# Patient Record
Sex: MALE | Race: WHITE | NOT HISPANIC OR LATINO | Employment: OTHER | URBAN - NONMETROPOLITAN AREA
[De-identification: names, ages, dates, MRNs, and addresses within clinical notes are randomized per-mention and may not be internally consistent; named-entity substitution may affect disease eponyms.]

---

## 2018-08-31 ENCOUNTER — TRANSCRIBE ORDERS (OUTPATIENT)
Dept: ADMINISTRATIVE | Facility: HOSPITAL | Age: 53
End: 2018-08-31

## 2018-08-31 DIAGNOSIS — R10.9 FLANK PAIN: Primary | ICD-10-CM

## 2018-09-06 ENCOUNTER — HOSPITAL ENCOUNTER (OUTPATIENT)
Dept: CT IMAGING | Facility: HOSPITAL | Age: 53
Discharge: HOME | End: 2018-09-06
Admitting: PHYSICIAN ASSISTANT

## 2018-09-06 DIAGNOSIS — R10.9 FLANK PAIN: ICD-10-CM

## 2018-09-06 PROCEDURE — 74176 CT ABD & PELVIS W/O CONTRAST: CPT

## 2018-11-01 ENCOUNTER — HOSPITAL ENCOUNTER (EMERGENCY)
Facility: HOSPITAL | Age: 53
Discharge: HOME | End: 2018-11-01
Attending: EMERGENCY MEDICINE | Admitting: EMERGENCY MEDICINE

## 2018-11-01 VITALS
SYSTOLIC BLOOD PRESSURE: 178 MMHG | BODY MASS INDEX: 34.16 KG/M2 | DIASTOLIC BLOOD PRESSURE: 94 MMHG | OXYGEN SATURATION: 95 % | HEIGHT: 65 IN | RESPIRATION RATE: 20 BRPM | HEART RATE: 89 BPM | WEIGHT: 205 LBS | TEMPERATURE: 97.8 F

## 2018-11-01 DIAGNOSIS — M54.5 LEFT LOW BACK PAIN, UNSPECIFIED CHRONICITY, WITH SCIATICA PRESENCE UNSPECIFIED: Primary | ICD-10-CM

## 2018-11-01 PROCEDURE — 99283 EMERGENCY DEPT VISIT LOW MDM: CPT

## 2018-11-01 RX ORDER — METHOCARBAMOL 500 MG/1
1500 TABLET, FILM COATED ORAL ONCE
Status: COMPLETED | OUTPATIENT
Start: 2018-11-01 | End: 2018-11-01

## 2018-11-01 RX ORDER — MELOXICAM 7.5 MG/1
7.5 TABLET ORAL DAILY
Qty: 14 TABLET | Refills: 0 | Status: SHIPPED | OUTPATIENT
Start: 2018-11-01

## 2018-11-01 RX ORDER — METHOCARBAMOL 750 MG/1
750 TABLET, FILM COATED ORAL 3 TIMES DAILY PRN
Qty: 21 TABLET | Refills: 0 | Status: SHIPPED | OUTPATIENT
Start: 2018-11-01

## 2018-11-01 RX ORDER — MELOXICAM 7.5 MG/1
7.5 TABLET ORAL DAILY
Status: DISCONTINUED | OUTPATIENT
Start: 2018-11-01 | End: 2018-11-02 | Stop reason: HOSPADM

## 2018-11-01 RX ADMIN — METHOCARBAMOL 1500 MG: 500 TABLET ORAL at 21:37

## 2018-11-01 RX ADMIN — MELOXICAM 7.5 MG: 7.5 TABLET ORAL at 21:36

## 2018-11-02 NOTE — ED PROVIDER NOTES
Subjective   History of Present Illness  This is a 53-year-old gentleman who comes in today complaining of lower back pain.  He reports he was bending over when he felt a pulling sensation in the left side of his lower back.  He states that he has back pain occasionally when he gets constipated and feels fullness putting pressure on his back.  He said she did treat his constipation earlier today and has had some relief but the pulling on his lower back has continued.  He did lay on a heating pad with minimal relief.  He denies any loss of bowel or bladder.  Review of Systems   Constitutional: Negative.    HENT: Negative.    Eyes: Negative.    Respiratory: Negative.    Cardiovascular: Negative.    Gastrointestinal: Negative.    Endocrine: Negative.    Genitourinary: Negative.    Musculoskeletal: Positive for back pain.   Skin: Negative.    Allergic/Immunologic: Negative.    Neurological: Negative.    Hematological: Negative.    Psychiatric/Behavioral: Negative.    All other systems reviewed and are negative.      Past Medical History:   Diagnosis Date   • Diabetes mellitus (CMS/[facility])    • Hypertension        No Known Allergies    History reviewed. No pertinent surgical history.    History reviewed. No pertinent family history.    Social History     Social History   • Marital status:      Social History Main Topics   • Smoking status: Never Smoker   • Alcohol use No   • Drug use: No     Other Topics Concern   • Not on file           Objective   Physical Exam   Constitutional: He appears well-developed and well-nourished.   Nursing note and vitals reviewed.  GEN: No acute distress  Head: Normocephalic, atraumatic  Eyes: Pupils equal round reactive to light  ENT: Posterior pharynx normal in appearance, oral mucosa is moist  Chest: Nontender to palpation  Cardiovascular: Regular rate  Lungs: Clear to auscultation bilaterally  Abdomen: Soft, nontender, nondistended, no peritoneal signs  Extremities: No edema,  normal appearance  Tender left paravertebral spine.  Neuro: GCS 15  Psych: Mood and affect are appropriate      Procedures           ED Course                  MDM  Number of Diagnoses or Management Options  Diagnosis management comments: I did offer to do further testing for his constipation but he states that this is a normal occurrence for him.  He denies any pain and he does not want to get an enema.  He states that the laxatives that he took earlier will help shortly.  He is only requesting something for his lower back pain today.  So we will give him anti-inflammatories and muscle relaxants and have him follow-up with his primary care provider also given him some back stretching exercises he is agreeable to this plan of care.       Amount and/or Complexity of Data Reviewed  Review and summarize past medical records: yes  Discuss the patient with other providers: yes    Risk of Complications, Morbidity, and/or Mortality  Presenting problems: minimal  Diagnostic procedures: minimal  Management options: minimal          Final diagnoses:   Left low back pain, unspecified chronicity, with sciatica presence unspecified            Aparna Irvin, APRN  11/01/18 2007

## 2020-12-23 NOTE — PROGRESS NOTES
Patient: Neftali Irvin    YOB: 1965    Date: 12/29/2020    Primary Care Provider: Jakub Arnold PA    Chief Complaint   Patient presents with   • Black or Bloody Stool       SUBJECTIVE:    History of present illness:  I saw the patient in the office today as a consultation for evaluation and treatment of recent Cologuard which was done 12/07/2020.Patient states strong family history of cancer. Patient states constipation, abdominal pain and occasion diarrhea however denies nausea or vomiting    No other problems at this time.    Patient's Body mass index is 36.94 kg/m². BMI is above normal parameters. Recommendations include: educational material and exercise counseling.    The following portions of the patient's history were reviewed and updated as appropriate: allergies, current medications, past family history, past medical history, past social history, past surgical history and problem list.    Review of Systems   Constitutional: Negative for chills, fever and unexpected weight change.   HENT: Negative for trouble swallowing and voice change.    Eyes: Negative for visual disturbance.   Respiratory: Negative for apnea, cough, chest tightness, shortness of breath and wheezing.    Cardiovascular: Negative for chest pain, palpitations and leg swelling.   Gastrointestinal: Positive for abdominal pain, blood in stool, constipation and diarrhea. Negative for abdominal distention, anal bleeding, nausea, rectal pain and vomiting.   Endocrine: Negative for cold intolerance and heat intolerance.   Genitourinary: Negative for difficulty urinating, dysuria, flank pain, scrotal swelling and testicular pain.   Musculoskeletal: Negative for back pain, gait problem and joint swelling.   Skin: Negative for color change, rash and wound.   Neurological: Negative for dizziness, syncope, speech difficulty, weakness, numbness and headaches.   Hematological: Negative for adenopathy. Does not bruise/bleed  "easily.   Psychiatric/Behavioral: Negative for confusion. The patient is not nervous/anxious.        History:  Past Medical History:   Diagnosis Date   • Diabetes mellitus (CMS/HCC)    • Hypertension        History reviewed. No pertinent surgical history.    Family History   Problem Relation Age of Onset   • Cancer Mother    • Heart disease Father        Social History     Tobacco Use   • Smoking status: Never Smoker   Substance Use Topics   • Alcohol use: No   • Drug use: No       Allergies:  No Known Allergies    Medications:    Current Outpatient Medications:   •  B-D UF III MINI PEN NEEDLES 31G X 5 MM misc, , Disp: , Rfl:   •  glipizide (GLUCOTROL XL) 2.5 MG 24 hr tablet, , Disp: , Rfl:   •  Lancets (OneTouch Delica Plus [id number]) misc, , Disp: , Rfl:   •  meloxicam (MOBIC) 7.5 MG tablet, Take 1 tablet by mouth Daily., Disp: 14 tablet, Rfl: 0  •  methocarbamol (ROBAXIN) 750 MG tablet, Take 1 tablet by mouth 3 (Three) Times a Day As Needed for Muscle Spasms., Disp: 21 tablet, Rfl: 0  •  OneTouch Verio test strip, , Disp: , Rfl:     OBJECTIVE:    Vital Signs:   Vitals:    12/29/20 1327   BP: 150/82   Pulse: 91   Temp: 97.4 °F (36.3 °C)   SpO2: 99%   Weight: 101 kg (222 lb)   Height: 165.1 cm (65\")       Physical Exam:   General Appearance:    Alert, cooperative, in no acute distress   Head:    Normocephalic, without obvious abnormality, atraumatic   Eyes:            Lids and lashes normal, conjunctivae and sclerae normal, no   icterus, no pallor, corneas clear, PERRL   Ears:    Ears appear intact with no abnormalities noted   Throat:   No oral lesions, no thrush, oral mucosa moist   Neck:   No adenopathy, supple, trachea midline, no thyromegaly,  no JVD   Lungs:     Clear to auscultation,respirations regular, even and                  unlabored    Heart:    Regular rhythm and normal rate, normal S1 and S2, no            murmur   Abdomen:     no masses, no organomegaly, soft non-tender, non-distended, no guarding, " there is no evidence of tenderness, no peritoneal signs   Extremities:   Moves all extremities well, no edema, no cyanosis, no             redness   Pulses:   Pulses palpable and equal bilaterally   Skin:   No bleeding, bruising or rash   Lymph nodes:   No palpable adenopathy   Neurologic:   Cranial nerves 2 - 12 grossly intact, sensation intact      Results Review:   I reviewed the patient's new clinical results.  I reviewed the patient's new imaging results and agree with the interpretation.  I reviewed the patient's other test results and agree with the interpretation    Review of Systems was reviewed and confirmed as accurate as documented by the MA.    ASSESSMENT/PLAN:    1. Heme positive stool        I recommend a colonoscopy for further evaluation. The procedure was explained as well as the risks which include but are not limited to bleeding, infection, perforation, abdominal pain etc. The patient understands these risks and the procedure and wishes to proceed.     Electronically signed by Michael Marroquin MD  12/30/20 11:05 EST

## 2020-12-29 ENCOUNTER — OFFICE VISIT (OUTPATIENT)
Dept: SURGERY | Facility: CLINIC | Age: 55
End: 2020-12-29

## 2020-12-29 VITALS
OXYGEN SATURATION: 99 % | TEMPERATURE: 97.4 F | HEART RATE: 91 BPM | HEIGHT: 65 IN | DIASTOLIC BLOOD PRESSURE: 82 MMHG | SYSTOLIC BLOOD PRESSURE: 150 MMHG | BODY MASS INDEX: 36.99 KG/M2 | WEIGHT: 222 LBS

## 2020-12-29 DIAGNOSIS — R19.5 HEME POSITIVE STOOL: Primary | ICD-10-CM

## 2020-12-29 DIAGNOSIS — Z01.818 PRE-OP TESTING: Primary | ICD-10-CM

## 2020-12-29 PROCEDURE — 99244 OFF/OP CNSLTJ NEW/EST MOD 40: CPT | Performed by: SURGERY

## 2020-12-29 RX ORDER — GLIPIZIDE 2.5 MG/1
2.5 TABLET, EXTENDED RELEASE ORAL DAILY
COMMUNITY
Start: 2020-12-04

## 2020-12-29 RX ORDER — FLURBIPROFEN SODIUM 0.3 MG/ML
SOLUTION/ DROPS OPHTHALMIC
COMMUNITY
Start: 2020-12-04

## 2020-12-29 RX ORDER — LANCETS 33 GAUGE
EACH MISCELLANEOUS
COMMUNITY
Start: 2020-12-04

## 2020-12-29 RX ORDER — BLOOD SUGAR DIAGNOSTIC
STRIP MISCELLANEOUS
COMMUNITY
Start: 2020-12-04

## 2020-12-31 ENCOUNTER — LAB (OUTPATIENT)
Dept: LAB | Facility: HOSPITAL | Age: 55
End: 2020-12-31

## 2020-12-31 ENCOUNTER — APPOINTMENT (OUTPATIENT)
Dept: LAB | Facility: HOSPITAL | Age: 55
End: 2020-12-31

## 2020-12-31 DIAGNOSIS — Z01.818 PRE-OP TESTING: ICD-10-CM

## 2020-12-31 PROBLEM — R19.5 HEME POSITIVE STOOL: Status: ACTIVE | Noted: 2020-12-31

## 2020-12-31 LAB — SARS-COV-2 RNA RESP QL NAA+PROBE: NOT DETECTED

## 2020-12-31 PROCEDURE — U0004 COV-19 TEST NON-CDC HGH THRU: HCPCS

## 2020-12-31 PROCEDURE — C9803 HOPD COVID-19 SPEC COLLECT: HCPCS

## 2020-12-31 RX ORDER — INSULIN GLARGINE 100 [IU]/ML
50 INJECTION, SOLUTION SUBCUTANEOUS DAILY
COMMUNITY

## 2020-12-31 RX ORDER — BISACODYL 5 MG
TABLET, DELAYED RELEASE (ENTERIC COATED) ORAL
Qty: 4 TABLET | Refills: 0 | Status: SHIPPED | OUTPATIENT
Start: 2020-12-31

## 2020-12-31 RX ORDER — LOSARTAN POTASSIUM 100 MG/1
100 TABLET ORAL DAILY
COMMUNITY

## 2020-12-31 RX ORDER — POLYETHYLENE GLYCOL 3350 17 G/17G
POWDER, FOR SOLUTION ORAL
Qty: 238 G | Refills: 0 | Status: SHIPPED | OUTPATIENT
Start: 2020-12-31

## 2020-12-31 RX ORDER — CHLORAL HYDRATE 500 MG
1000 CAPSULE ORAL
COMMUNITY

## 2020-12-31 NOTE — TELEPHONE ENCOUNTER
Please approve rx for patient's prep for colonoscopy Monday. Pharmacy will not allow me to call it in over the phone.

## 2021-02-25 ENCOUNTER — TELEPHONE (OUTPATIENT)
Dept: SURGERY | Facility: CLINIC | Age: 56
End: 2021-02-25

## 2021-02-25 DIAGNOSIS — Z01.818 PRE-OP TESTING: Primary | ICD-10-CM

## 2021-02-25 NOTE — TELEPHONE ENCOUNTER
Pt rescheduled colonoscopy for 03/11/2021.  Rx for prep was sent to [facility], I called to confirm that they still had on file, I left message asking them to return my call.

## 2021-03-09 ENCOUNTER — LAB (OUTPATIENT)
Dept: LAB | Facility: HOSPITAL | Age: 56
End: 2021-03-09

## 2021-03-09 DIAGNOSIS — Z01.818 PRE-OP TESTING: ICD-10-CM

## 2021-03-09 LAB — SARS-COV-2 RNA RESP QL NAA+PROBE: NOT DETECTED

## 2021-03-09 PROCEDURE — U0004 COV-19 TEST NON-CDC HGH THRU: HCPCS

## 2021-03-09 PROCEDURE — C9803 HOPD COVID-19 SPEC COLLECT: HCPCS

## 2021-03-11 ENCOUNTER — ANESTHESIA (OUTPATIENT)
Dept: GASTROENTEROLOGY | Facility: HOSPITAL | Age: 56
End: 2021-03-11

## 2021-03-11 ENCOUNTER — HOSPITAL ENCOUNTER (OUTPATIENT)
Facility: HOSPITAL | Age: 56
Setting detail: HOSPITAL OUTPATIENT SURGERY
Discharge: HOME | End: 2021-03-11
Attending: SURGERY | Admitting: SURGERY

## 2021-03-11 ENCOUNTER — ANESTHESIA EVENT (OUTPATIENT)
Dept: GASTROENTEROLOGY | Facility: HOSPITAL | Age: 56
End: 2021-03-11

## 2021-03-11 VITALS
BODY MASS INDEX: 33.32 KG/M2 | RESPIRATION RATE: 18 BRPM | HEIGHT: 65 IN | WEIGHT: 200 LBS | OXYGEN SATURATION: 95 % | SYSTOLIC BLOOD PRESSURE: 156 MMHG | HEART RATE: 71 BPM | TEMPERATURE: 97 F | DIASTOLIC BLOOD PRESSURE: 85 MMHG

## 2021-03-11 DIAGNOSIS — R19.5 HEME POSITIVE STOOL: ICD-10-CM

## 2021-03-11 LAB — GLUCOSE BLDC GLUCOMTR-MCNC: 233 MG/DL (ref 70–130)

## 2021-03-11 PROCEDURE — 45381 COLONOSCOPY SUBMUCOUS NJX: CPT | Performed by: SURGERY

## 2021-03-11 PROCEDURE — 45385 COLONOSCOPY W/LESION REMOVAL: CPT | Performed by: SURGERY

## 2021-03-11 PROCEDURE — 82962 GLUCOSE BLOOD TEST: CPT

## 2021-03-11 PROCEDURE — S0260 H&P FOR SURGERY: HCPCS | Performed by: SURGERY

## 2021-03-11 PROCEDURE — 45384 COLONOSCOPY W/LESION REMOVAL: CPT | Performed by: SURGERY

## 2021-03-11 PROCEDURE — 25010000002 PROPOFOL 10 MG/ML EMULSION: Performed by: NURSE ANESTHETIST, CERTIFIED REGISTERED

## 2021-03-11 DEVICE — CLIPPING DEVICE
Type: IMPLANTABLE DEVICE | Site: ABDOMEN | Status: FUNCTIONAL
Brand: RESOLUTION CLIP

## 2021-03-11 RX ORDER — MAGNESIUM HYDROXIDE 1200 MG/15ML
LIQUID ORAL AS NEEDED
Status: DISCONTINUED | OUTPATIENT
Start: 2021-03-11 | End: 2021-03-11 | Stop reason: HOSPADM

## 2021-03-11 RX ORDER — LIDOCAINE HYDROCHLORIDE 20 MG/ML
INJECTION, SOLUTION INTRAVENOUS AS NEEDED
Status: DISCONTINUED | OUTPATIENT
Start: 2021-03-11 | End: 2021-03-11 | Stop reason: SURG

## 2021-03-11 RX ORDER — ONDANSETRON 2 MG/ML
4 INJECTION INTRAMUSCULAR; INTRAVENOUS ONCE AS NEEDED
Status: CANCELLED | OUTPATIENT
Start: 2021-03-11 | End: 2021-03-11

## 2021-03-11 RX ORDER — SODIUM CHLORIDE, SODIUM LACTATE, POTASSIUM CHLORIDE, CALCIUM CHLORIDE 600; 310; 30; 20 MG/100ML; MG/100ML; MG/100ML; MG/100ML
1000 INJECTION, SOLUTION INTRAVENOUS CONTINUOUS
Status: DISCONTINUED | OUTPATIENT
Start: 2021-03-11 | End: 2021-03-11 | Stop reason: HOSPADM

## 2021-03-11 RX ORDER — PROPOFOL 10 MG/ML
VIAL (ML) INTRAVENOUS AS NEEDED
Status: DISCONTINUED | OUTPATIENT
Start: 2021-03-11 | End: 2021-03-11 | Stop reason: SURG

## 2021-03-11 RX ADMIN — LIDOCAINE HYDROCHLORIDE 100 MG: 20 INJECTION, SOLUTION INTRAVENOUS at 09:24

## 2021-03-11 RX ADMIN — PROPOFOL 400 MG: 10 INJECTION, EMULSION INTRAVENOUS at 09:50

## 2021-03-11 RX ADMIN — SODIUM CHLORIDE, POTASSIUM CHLORIDE, SODIUM LACTATE AND CALCIUM CHLORIDE 1000 ML: 600; 310; 30; 20 INJECTION, SOLUTION INTRAVENOUS at 07:31

## 2021-03-11 NOTE — ANESTHESIA POSTPROCEDURE EVALUATION
Patient: Neftali Irvin    Procedure Summary     Date: 03/11/21 Room / Location: [facility] ENDOSCOPY 2 / [facility] ENDOSCOPY    Anesthesia Start: 0918 Anesthesia Stop: 0956    Procedure: COLONOSCOPY WITH HOT SNARE POLYPECTOMY AND TATTOO INJECTION, CLIP PLACEMENT X1 (N/A ) Diagnosis:       Heme positive stool      (Heme positive stool [R19.5])    Surgeons: Michael Marroquin MD Provider: Abhishek Willis CRNA    Anesthesia Type: MAC ASA Status: 3          Anesthesia Type: MAC    Vitals  Vitals Value Taken Time   /84 03/11/21 0958   Temp 97 °F (36.1 °C) 03/11/21 0958   Pulse 73 03/11/21 0958   Resp 20 03/11/21 0958   SpO2 97 % 03/11/21 0958           Post Anesthesia Care and Evaluation    Patient location during evaluation: PHASE II  Patient participation: complete - patient participated  Level of consciousness: awake  Pain score: 1  Pain management: adequate  Airway patency: patent  Anesthetic complications: No anesthetic complications  PONV Status: controlled  Cardiovascular status: acceptable and stable  Respiratory status: acceptable  Hydration status: acceptable

## 2021-03-11 NOTE — ANESTHESIA PREPROCEDURE EVALUATION
Anesthesia Evaluation     Patient summary reviewed and Nursing notes reviewed   no history of anesthetic complications:  NPO Solid Status: > 8 hours  NPO Liquid Status: > 8 hours           Airway   Mallampati: II  TM distance: >3 FB  Neck ROM: full  no difficulty expected  Dental - normal exam     Pulmonary - normal exam   (+) asthma,sleep apnea,   Cardiovascular - normal exam    Rhythm: regular  Rate: normal    (+) hypertension, hyperlipidemia,       Neuro/Psych  (+) psychiatric history Anxiety and Depression,     GI/Hepatic/Renal/Endo    (+) obesity,  GERD,  diabetes mellitus using insulin,     Musculoskeletal     Abdominal    Substance History - negative use     OB/GYN negative ob/gyn ROS         Other   arthritis,                      Anesthesia Plan    ASA 3     MAC   (Pt told that intravenous sedation will be used as the primary anesthetic along with local anesthesia if necessary. Every effort will be made to make sure the patient is comfortable.     The patient was told they may or may not have recall for the procedure. It was further explained that if the MAC was not adequate that a general anesthetic with either an LMA or endotracheal tube would be required.     Will proceed with the plan of care.)  intravenous induction     Anesthetic plan, all risks, benefits, and alternatives have been provided, discussed and informed consent has been obtained with: patient.

## 2021-03-16 LAB
LAB AP CASE REPORT: NORMAL
PATH REPORT.FINAL DX SPEC: NORMAL

## 2021-03-18 ENCOUNTER — OFFICE VISIT (OUTPATIENT)
Dept: SURGERY | Facility: CLINIC | Age: 56
End: 2021-03-18

## 2021-03-18 VITALS
WEIGHT: 225 LBS | BODY MASS INDEX: 37.49 KG/M2 | HEIGHT: 65 IN | TEMPERATURE: 98.7 F | OXYGEN SATURATION: 98 % | DIASTOLIC BLOOD PRESSURE: 78 MMHG | SYSTOLIC BLOOD PRESSURE: 126 MMHG | HEART RATE: 80 BPM

## 2021-03-18 DIAGNOSIS — D12.3 ADENOMATOUS POLYP OF TRANSVERSE COLON: Primary | ICD-10-CM

## 2021-03-18 PROCEDURE — 99213 OFFICE O/P EST LOW 20 MIN: CPT | Performed by: SURGERY

## 2021-03-18 NOTE — PROGRESS NOTES
Patient: Neftali Irvin    YOB: 1965    Date: 03/18/2021    Primary Care Provider: Jakub Arnold PA    Reason for Consultation: Follow-up colonoscopy    Chief Complaint   Patient presents with   • Follow-up     colon       History of present illness:  I saw the patient in the office today as a followup from their recent colonoscopy with polypectomy and proximal transverse colon tattooing, the pathology report did show sessile serrated adenoma and hyperplastic polyps.  They state that they have done well and are having no complaints.    He did have a proximal transverse colon polyp removed that was a sessile adenoma, tattooing was performed at the time of endoscopy.    The following portions of the patient's history were reviewed and updated as appropriate: allergies, current medications, past family history, past medical history, past social history, past surgical history and problem list.    Review of Systems   Constitutional: Negative for chills, fever and unexpected weight change.   HENT: Negative for trouble swallowing and voice change.    Eyes: Negative for visual disturbance.   Respiratory: Negative for apnea, cough, chest tightness, shortness of breath and wheezing.    Cardiovascular: Negative for chest pain, palpitations and leg swelling.   Gastrointestinal: Negative for abdominal distention, abdominal pain, anal bleeding, blood in stool, constipation, diarrhea, nausea, rectal pain and vomiting.   Endocrine: Negative for cold intolerance and heat intolerance.   Genitourinary: Negative for difficulty urinating, dysuria, flank pain, scrotal swelling and testicular pain.   Musculoskeletal: Negative for back pain, gait problem and joint swelling.   Skin: Negative for color change, rash and wound.   Neurological: Negative for dizziness, syncope, speech difficulty, weakness, numbness and headaches.   Hematological: Negative for adenopathy. Does not bruise/bleed easily.  "  Psychiatric/Behavioral: Negative for confusion. The patient is not nervous/anxious.        Allergies:  Allergies   Allergen Reactions   • Grapeseed Extract [Nutritional Supplements] Hives     Allergy to grapes   • Milk-Related Compounds Other (See Comments)     Runny nose, post nasal drip   • Other Other (See Comments)     Powder detergents cause blisters     • Red Dye Hives   • Strawberry Hives       Medications:    Current Outpatient Medications:   •  B-D UF III MINI PEN NEEDLES 31G X 5 MM misc, , Disp: , Rfl:   •  bisacodyl (Dulcolax) 5 MG EC tablet, Take 4 tablets at 1:00PM with 8oz of clear liquids the day before your colonoscopy., Disp: 4 tablet, Rfl: 0  •  glipizide (GLUCOTROL XL) 2.5 MG 24 hr tablet, Take 2.5 mg by mouth Daily., Disp: , Rfl:   •  Insulin Glargine (BASAGLAR KWIKPEN) 100 UNIT/ML injection pen, Inject 50 Units under the skin into the appropriate area as directed Daily., Disp: , Rfl:   •  Lancets (OneTouch Delica Plus [id number]) misc, , Disp: , Rfl:   •  losartan (COZAAR) 100 MG tablet, Take 100 mg by mouth Daily., Disp: , Rfl:   •  meloxicam (MOBIC) 7.5 MG tablet, Take 1 tablet by mouth Daily. (Patient not taking: Reported on 12/31/2020), Disp: 14 tablet, Rfl: 0  •  methocarbamol (ROBAXIN) 750 MG tablet, Take 1 tablet by mouth 3 (Three) Times a Day As Needed for Muscle Spasms. (Patient not taking: Reported on 12/31/2020), Disp: 21 tablet, Rfl: 0  •  Omega-3 Fatty Acids (fish oil) 1000 MG capsule capsule, Take 1,000 mg by mouth Daily With Breakfast., Disp: , Rfl:   •  OneTouch Verio test strip, , Disp: , Rfl:   •  polyethylene glycol (MIRALAX) 17 GM/SCOOP powder, Mix 238g powder with 64oz of clear liquid at 4:00PM. Drink 8oz every 10-15 minutes until consumed., Disp: 238 g, Rfl: 0    Vital Signs:  Vitals:    03/18/21 1348   BP: 126/78   Pulse: 80   Temp: 98.7 °F (37.1 °C)   SpO2: 98%   Weight: 102 kg (225 lb)   Height: 165.1 cm (65\")       Physical Exam:   General Appearance:    Alert, " cooperative, in no acute distress   Abdomen:     no masses, no organomegaly, soft non-tender, non-distended, no guarding, wounds are well healed, no evidence of recurrent hernia, no peritoneal signs   Chest:      Clear to ausculation            Cor:     Regular rate and rhythm    Results Review:   I reviewed the patient's new clinical results.  I reviewed the patient's new imaging results and agree with the interpretation.  I reviewed the patient's other test results and agree with the interpretation    Assessment / Plan:    1. Adenomatous polyp of transverse colon        I did discuss the situation with the patient today in the office and they have done well from their recent colonoscopy with polypectomy.  I have released the patient back to normal activity.  I need to see the patient back in the office in 6 months for short-term follow-up colonoscopy due to the flat nature of the above-mentioned proximal transverse colon sessile serrated adenoma and they will need to have repeat colonoscopy at that time.    Electronically signed by Michael Marroquin MD  03/18/21

## 2021-06-14 ENCOUNTER — TRANSCRIBE ORDERS (OUTPATIENT)
Dept: ADMINISTRATIVE | Facility: HOSPITAL | Age: 56
End: 2021-06-14

## 2021-06-14 DIAGNOSIS — R10.32 LEFT GROIN PAIN: Primary | ICD-10-CM

## 2021-06-16 ENCOUNTER — HOSPITAL ENCOUNTER (OUTPATIENT)
Dept: ULTRASOUND IMAGING | Facility: HOSPITAL | Age: 56
Discharge: HOME | End: 2021-06-16
Admitting: NURSE PRACTITIONER

## 2021-06-16 DIAGNOSIS — R10.32 LEFT GROIN PAIN: ICD-10-CM

## 2021-06-16 PROCEDURE — 76882 US LMTD JT/FCL EVL NVASC XTR: CPT

## 2021-06-28 NOTE — PROGRESS NOTES
Patient: Neftali Irvin    YOB: 1965    Date: 06/29/2021    Primary Care Provider: Jakub Arnold PA    Chief Complaint   Patient presents with   • Groin Pain     left       SUBJECTIVE:    History of present illness:  I saw the patient in the office today as a consultation for evaluation and treatment of a painful bulge in his left inguinal area.  Patient had left groin ultrasound performed 06/16/2021 which showed a fat-containing left inguinal hernia.    He does complain of some dull discomfort in the left inguinal region, worse after heavy lifting, associated with a palpable mass, present over several months.    The following portions of the patient's history were reviewed and updated as appropriate: allergies, current medications, past family history, past medical history, past social history, past surgical history and problem list.    Review of Systems   Constitutional: Negative for chills, fever and unexpected weight change.   HENT: Negative for trouble swallowing and voice change.    Eyes: Negative for visual disturbance.   Respiratory: Negative for apnea, cough, chest tightness, shortness of breath and wheezing.    Cardiovascular: Negative for chest pain, palpitations and leg swelling.   Gastrointestinal: Negative for abdominal distention, abdominal pain, anal bleeding, blood in stool, constipation, diarrhea, nausea, rectal pain and vomiting.   Endocrine: Negative for cold intolerance and heat intolerance.   Genitourinary: Negative for difficulty urinating, dysuria, flank pain, scrotal swelling and testicular pain.   Musculoskeletal: Negative for back pain, gait problem and joint swelling.   Skin: Negative for color change, rash and wound.   Neurological: Negative for dizziness, syncope, speech difficulty, weakness, numbness and headaches.   Hematological: Negative for adenopathy. Does not bruise/bleed easily.   Psychiatric/Behavioral: Negative for confusion. The patient is not  "nervous/anxious.        History:  Past Medical History:   Diagnosis Date   • Acid reflux    • ADHD    • Arthritis    • Asthma    • Blood vessel problems in eyes     states eye doctor told him he had \"some bleeding\" behind his eyes due to DM   • Cataract, bilateral     no surgery yet   • Diabetes mellitus (CMS/HCC)    • Elevated cholesterol    • Histoplasmosis     eye (doesn't remember laterality)   • Hx of exercise stress test    • Hypertension    • PONV (postoperative nausea and vomiting)    • Sleep apnea     non-compliant with CPAP   • Teeth missing    • Wears glasses        Past Surgical History:   Procedure Laterality Date   • COLONOSCOPY N/A 3/11/2021    Procedure: COLONOSCOPY WITH HOT SNARE POLYPECTOMY AND TATTOO INJECTION, CLIP PLACEMENT X1;  Surgeon: Michael Marrouqin MD;  Location: [facility] ENDOSCOPY;  Service: Gastroenterology;  Laterality: N/A;   • FOOT SURGERY Right     pellet in foot       Family History   Problem Relation Age of Onset   • Cancer Mother    • Heart disease Father        Social History     Tobacco Use   • Smoking status: Never Smoker   • Smokeless tobacco: Never Used   Vaping Use   • Vaping Use: Never used   Substance Use Topics   • Alcohol use: No   • Drug use: No       Allergies:  Allergies   Allergen Reactions   • Grapeseed Extract [Nutritional Supplements] Hives     Allergy to grapes   • Milk-Related Compounds Other (See Comments)     Runny nose, post nasal drip   • Other Other (See Comments)     Powder detergents cause blisters     • Red Dye Hives   • Strawberry Hives       Medications:    Current Outpatient Medications:   •  Aspirin Low Dose 81 MG EC tablet, , Disp: , Rfl:   •  B-D UF III MINI PEN NEEDLES 31G X 5 MM misc, , Disp: , Rfl:   •  bisacodyl (Dulcolax) 5 MG EC tablet, Take 4 tablets at 1:00PM with 8oz of clear liquids the day before your colonoscopy., Disp: 4 tablet, Rfl: 0  •  chlorthalidone (HYGROTON) 25 MG tablet, , Disp: , Rfl:   •  glipizide (GLUCOTROL XL) 2.5 MG 24 hr " "tablet, Take 2.5 mg by mouth Daily., Disp: , Rfl:   •  Insulin Glargine (BASAGLAR KWIKPEN) 100 UNIT/ML injection pen, Inject 50 Units under the skin into the appropriate area as directed Daily., Disp: , Rfl:   •  Januvia 50 MG tablet, , Disp: , Rfl:   •  Lancets (OneTouch Delica Plus [id number]) misc, , Disp: , Rfl:   •  losartan (COZAAR) 100 MG tablet, Take 100 mg by mouth Daily., Disp: , Rfl:   •  meloxicam (MOBIC) 7.5 MG tablet, Take 1 tablet by mouth Daily. (Patient not taking: Reported on 12/31/2020), Disp: 14 tablet, Rfl: 0  •  methocarbamol (ROBAXIN) 750 MG tablet, Take 1 tablet by mouth 3 (Three) Times a Day As Needed for Muscle Spasms. (Patient not taking: Reported on 12/31/2020), Disp: 21 tablet, Rfl: 0  •  Omega-3 Fatty Acids (fish oil) 1000 MG capsule capsule, Take 1,000 mg by mouth Daily With Breakfast., Disp: , Rfl:   •  OneTouch Verio test strip, , Disp: , Rfl:   •  polyethylene glycol (MIRALAX) 17 GM/SCOOP powder, Mix 238g powder with 64oz of clear liquid at 4:00PM. Drink 8oz every 10-15 minutes until consumed., Disp: 238 g, Rfl: 0    OBJECTIVE:    Vital Signs:   Vitals:    06/29/21 1237   BP: 130/78   Pulse: 72   Temp: 97.7 °F (36.5 °C)   SpO2: 97%   Weight: 103 kg (227 lb)   Height: 165.1 cm (65\")       Physical Exam:   General Appearance:    Alert, cooperative, in no acute distress   Head:    Normocephalic, without obvious abnormality, atraumatic   Eyes:            Lids and lashes normal, conjunctivae and sclerae normal, no   icterus, no pallor, corneas clear, PERRLA   Ears:    Ears appear intact with no abnormalities noted   Throat:   No oral lesions, no thrush, oral mucosa moist   Neck:   No adenopathy, supple, trachea midline, no thyromegaly, no   carotid bruit, no JVD   Lungs:     Clear to auscultation,respirations regular, even and                  unlabored    Heart:    Regular rhythm and normal rate, normal S1 and S2, no            murmur   Abdomen:     no masses, no organomegaly, soft " non-tender, non-distended, no guarding, there is evidence of a reducible left inguinal hernia   Extremities:   Moves all extremities well, no edema, no cyanosis, no             redness   Pulses:   Pulses palpable and equal bilaterally   Skin:   No bleeding, bruising or rash   Lymph nodes:   No palpable adenopathy   Neurologic:   Cranial nerves 2 - 12 grossly intact, sensation intact        Results Review:   I reviewed the patient's new clinical results.  I reviewed the patient's new imaging results and agree with the interpretation.  I reviewed the patient's other test results and agree with the interpretation    Review of Systems was reviewed and confirmed as accurate as documented by the MA.    ASSESSMENT/PLAN:    1. Non-recurrent unilateral inguinal hernia with obstruction without gangrene        I had a detailed and extensive discussion with the patient in the office and they understand that they need to undergo hernia repair with mesh.  Full risks and benefits of operative versus nonoperative intervention were discussed with the patient and these included things such as nonresolution of symptoms and possible worsening of symptoms without surgical intervention versus infection, bleeding, possible recurrent hernia, possible postoperative neuralgia from nerve damage or involvement with scar tissue, etc.  The patient understands, agrees, and had no questions for me at the end of the office visit.     I discussed the patients findings and my recommendations with patient.    Electronically signed by Michael Marroquin MD  06/29/21

## 2021-06-29 ENCOUNTER — OFFICE VISIT (OUTPATIENT)
Dept: SURGERY | Facility: CLINIC | Age: 56
End: 2021-06-29

## 2021-06-29 VITALS
TEMPERATURE: 97.7 F | OXYGEN SATURATION: 97 % | WEIGHT: 227 LBS | BODY MASS INDEX: 37.82 KG/M2 | HEIGHT: 65 IN | HEART RATE: 72 BPM | SYSTOLIC BLOOD PRESSURE: 130 MMHG | DIASTOLIC BLOOD PRESSURE: 78 MMHG

## 2021-06-29 DIAGNOSIS — Z01.818 PRE-OP TESTING: Primary | ICD-10-CM

## 2021-06-29 DIAGNOSIS — K40.30 NON-RECURRENT UNILATERAL INGUINAL HERNIA WITH OBSTRUCTION WITHOUT GANGRENE: Primary | ICD-10-CM

## 2021-06-29 PROCEDURE — 99214 OFFICE O/P EST MOD 30 MIN: CPT | Performed by: SURGERY

## 2021-06-29 RX ORDER — SITAGLIPTIN 50 MG/1
TABLET, FILM COATED ORAL
COMMUNITY
Start: 2021-05-14

## 2021-06-29 RX ORDER — CHLORTHALIDONE 25 MG/1
TABLET ORAL
COMMUNITY
Start: 2021-05-14

## 2021-06-29 RX ORDER — ASPIRIN 81 MG/1
TABLET, COATED ORAL
COMMUNITY
Start: 2021-05-14

## 2021-07-07 ENCOUNTER — LAB (OUTPATIENT)
Dept: LAB | Facility: HOSPITAL | Age: 56
End: 2021-07-07

## 2021-07-07 DIAGNOSIS — Z01.818 PRE-OP TESTING: ICD-10-CM

## 2021-07-07 LAB — SARS-COV-2 RNA NOSE QL NAA+PROBE: NOT DETECTED

## 2021-07-07 PROCEDURE — C9803 HOPD COVID-19 SPEC COLLECT: HCPCS

## 2021-07-07 PROCEDURE — U0004 COV-19 TEST NON-CDC HGH THRU: HCPCS

## 2021-07-09 ENCOUNTER — OUTSIDE FACILITY SERVICE (OUTPATIENT)
Dept: SURGERY | Facility: CLINIC | Age: 56
End: 2021-07-09

## 2021-07-09 DIAGNOSIS — K40.30 NON-RECURRENT UNILATERAL INGUINAL HERNIA WITH OBSTRUCTION WITHOUT GANGRENE: Primary | ICD-10-CM

## 2021-07-09 PROCEDURE — 49505 PRP I/HERN INIT REDUC >5 YR: CPT | Performed by: SURGERY

## 2021-07-09 RX ORDER — HYDROCODONE BITARTRATE AND ACETAMINOPHEN 7.5; 325 MG/1; MG/1
1 TABLET ORAL EVERY 6 HOURS PRN
Qty: 15 TABLET | Refills: 0 | Status: SHIPPED | OUTPATIENT
Start: 2021-07-09

## 2021-07-22 NOTE — PROGRESS NOTES
"Patient: Neftali Irvin    YOB: 1965    Date: 07/26/2021    Primary Care Provider: Mary Machado APRN    Chief Complaint   Patient presents with   • Post-op Follow-up     hernia       History of present illness:  I saw the patient in the office today as a followup from their recent left inguinal hernia repair. Pathology report showed findings consistent with hernia formation They state that they have done well and are having no complaints.    Vital Signs:   Vitals:    07/26/21 1116   BP: 138/70   Pulse: 77   Temp: 97.8 °F (36.6 °C)   SpO2: 95%   Weight: 97.1 kg (214 lb)   Height: 165.1 cm (65\")       Physical Exam:   General Appearance:    Alert, cooperative, in no acute distress   Abdomen:     no masses, no organomegaly, soft non-tender, non-distended, no guarding, wounds are well healed, no evidence of recurrent hernia     Assessment / Plan:    1. Postoperative visit        I did discuss the situation with the patient today in the office and they have done well from their recent left inguinal herniorraphy.  I have released the patient back to normal activity, they understand that they need to be careful about heavy lifting.  I need to see the patient back in the office only if they are having further problems, they know to call me if they are.    Electronically signed by Michael Marroquin MD  07/26/21                "

## 2021-07-26 ENCOUNTER — OFFICE VISIT (OUTPATIENT)
Dept: SURGERY | Facility: CLINIC | Age: 56
End: 2021-07-26

## 2021-07-26 VITALS
DIASTOLIC BLOOD PRESSURE: 70 MMHG | HEART RATE: 77 BPM | SYSTOLIC BLOOD PRESSURE: 138 MMHG | TEMPERATURE: 97.8 F | WEIGHT: 214 LBS | BODY MASS INDEX: 35.65 KG/M2 | OXYGEN SATURATION: 95 % | HEIGHT: 65 IN

## 2021-07-26 DIAGNOSIS — Z48.89 POSTOPERATIVE VISIT: Primary | ICD-10-CM

## 2021-07-26 PROCEDURE — 99024 POSTOP FOLLOW-UP VISIT: CPT | Performed by: SURGERY

## 2021-07-26 RX ORDER — AMLODIPINE BESYLATE 10 MG/1
TABLET ORAL
COMMUNITY
Start: 2021-07-16

## 2021-07-26 RX ORDER — SULFAMETHOXAZOLE AND TRIMETHOPRIM 800; 160 MG/1; MG/1
1 TABLET ORAL 2 TIMES DAILY
Qty: 14 TABLET | Refills: 0 | Status: SHIPPED | OUTPATIENT
Start: 2021-07-26 | End: 2021-08-02

## 2021-11-23 ENCOUNTER — TELEPHONE (OUTPATIENT)
Dept: SURGERY | Facility: CLINIC | Age: 56
End: 2021-11-23

## 2021-11-23 NOTE — TELEPHONE ENCOUNTER
Called patient concerning 6 month follow up colon that patient canceled with Dr Marroquin. No answer,left message to call office and reschedule.

## 2022-01-18 ENCOUNTER — TRANSCRIBE ORDERS (OUTPATIENT)
Dept: ADMINISTRATIVE | Facility: HOSPITAL | Age: 57
End: 2022-01-18

## 2022-01-18 DIAGNOSIS — R10.9 RIGHT SIDED ABDOMINAL PAIN: Primary | ICD-10-CM

## 2022-01-23 ENCOUNTER — APPOINTMENT (OUTPATIENT)
Dept: GENERAL RADIOLOGY | Facility: HOSPITAL | Age: 57
End: 2022-01-23

## 2022-01-23 ENCOUNTER — HOSPITAL ENCOUNTER (EMERGENCY)
Facility: HOSPITAL | Age: 57
Discharge: HOME | End: 2022-01-23
Attending: EMERGENCY MEDICINE | Admitting: EMERGENCY MEDICINE

## 2022-01-23 VITALS
HEART RATE: 98 BPM | DIASTOLIC BLOOD PRESSURE: 88 MMHG | RESPIRATION RATE: 20 BRPM | SYSTOLIC BLOOD PRESSURE: 161 MMHG | TEMPERATURE: 98 F | WEIGHT: 209 LBS | HEIGHT: 65 IN | BODY MASS INDEX: 34.82 KG/M2 | OXYGEN SATURATION: 96 %

## 2022-01-23 DIAGNOSIS — J18.9 COMMUNITY ACQUIRED PNEUMONIA, UNSPECIFIED LATERALITY: Primary | ICD-10-CM

## 2022-01-23 PROCEDURE — 71045 X-RAY EXAM CHEST 1 VIEW: CPT

## 2022-01-23 PROCEDURE — 99283 EMERGENCY DEPT VISIT LOW MDM: CPT

## 2022-01-23 RX ORDER — GUAIFENESIN/DEXTROMETHORPHAN 100-10MG/5
10 SYRUP ORAL ONCE
Status: COMPLETED | OUTPATIENT
Start: 2022-01-23 | End: 2022-01-23

## 2022-01-23 RX ORDER — DOXYCYCLINE 100 MG/1
100 CAPSULE ORAL ONCE
Status: DISCONTINUED | OUTPATIENT
Start: 2022-01-23 | End: 2022-01-23 | Stop reason: HOSPADM

## 2022-01-23 RX ORDER — GUAIFENESIN AND CODEINE PHOSPHATE 100; 10 MG/5ML; MG/5ML
5 SOLUTION ORAL 4 TIMES DAILY PRN
Qty: 120 ML | Refills: 0 | Status: SHIPPED | OUTPATIENT
Start: 2022-01-23

## 2022-01-23 RX ORDER — ACETAMINOPHEN AND CODEINE PHOSPHATE 300; 30 MG/1; MG/1
1 TABLET ORAL ONCE
Status: COMPLETED | OUTPATIENT
Start: 2022-01-23 | End: 2022-01-23

## 2022-01-23 RX ORDER — DOXYCYCLINE 100 MG/1
100 CAPSULE ORAL 2 TIMES DAILY
Qty: 14 CAPSULE | Refills: 0 | Status: SHIPPED | OUTPATIENT
Start: 2022-01-23

## 2022-01-23 RX ADMIN — GUAIFENESIN AND DEXTROMETHORPHAN 10 ML: 100; 10 SYRUP ORAL at 18:38

## 2022-01-23 RX ADMIN — ACETAMINOPHEN AND CODEINE PHOSPHATE 1 TABLET: 300; 30 TABLET ORAL at 18:38

## 2022-02-02 ENCOUNTER — HOSPITAL ENCOUNTER (OUTPATIENT)
Dept: CT IMAGING | Facility: HOSPITAL | Age: 57
Discharge: HOME | End: 2022-02-02
Admitting: NURSE PRACTITIONER

## 2022-02-02 DIAGNOSIS — R10.9 RIGHT SIDED ABDOMINAL PAIN: ICD-10-CM

## 2022-02-02 PROCEDURE — 74170 CT ABD WO CNTRST FLWD CNTRST: CPT

## 2022-02-02 PROCEDURE — 25010000002 IOPAMIDOL 61 % SOLUTION: Performed by: NURSE PRACTITIONER

## 2022-02-02 RX ADMIN — IOPAMIDOL 100 ML: 612 INJECTION, SOLUTION INTRAVENOUS at 13:03

## 2022-02-03 NOTE — ED PROVIDER NOTES
"Subjective   History of Present Illness    Chief Complaint: Cough  History of Present Illness: 57-year-old male presents with cough, insomnia, coughs until he hurts in his sides.  Covid 2 weeks prior  Onset: Last few days, states he had been feeling better related to his covid  Duration: Persistent  Exacerbating / Alleviating factors: None  Associated symptoms: None      Nurses Notes reviewed and agree, including vitals, allergies, social history and prior medical history.     REVIEW OF SYSTEMS: All systems reviewed and not pertinent unless noted.    Positive for: Cough until he hurts in his sides, insomnia    Negative for: Fever hemoptysis syncope chest pain palpitations  Review of Systems    Past Medical History:   Diagnosis Date   • Acid reflux    • ADHD    • Arthritis    • Asthma    • Blood vessel problems in eyes     states eye doctor told him he had \"some bleeding\" behind his eyes due to DM   • Cataract, bilateral     no surgery yet   • Diabetes mellitus (HCC)    • Elevated cholesterol    • Histoplasmosis     eye (doesn't remember laterality)   • Hx of exercise stress test    • Hypertension    • PONV (postoperative nausea and vomiting)    • Sleep apnea     non-compliant with CPAP   • Teeth missing    • Wears glasses        Allergies   Allergen Reactions   • Grapeseed Extract [Nutritional Supplements] Hives     Allergy to grapes   • Milk-Related Compounds Other (See Comments)     Runny nose, post nasal drip   • Other Other (See Comments)     Powder detergents cause blisters     • Red Dye Hives   • Strawberry Hives       Past Surgical History:   Procedure Laterality Date   • COLONOSCOPY N/A 3/11/2021    Procedure: COLONOSCOPY WITH HOT SNARE POLYPECTOMY AND TATTOO INJECTION, CLIP PLACEMENT X1;  Surgeon: Michael Marroquin MD;  Location: [facility];  Service: Gastroenterology;  Laterality: N/A;   • FOOT SURGERY Right     pellet in foot       Family History   Problem Relation Age of Onset   • Cancer Mother    • " Heart disease Father        Social History     Socioeconomic History   • Marital status:    Tobacco Use   • Smoking status: Never Smoker   • Smokeless tobacco: Never Used   Vaping Use   • Vaping Use: Never used   Substance and Sexual Activity   • Alcohol use: No   • Drug use: No   • Sexual activity: Defer           Objective   Physical Exam    CONSTITUTIONAL: Well developed, nontoxic 57-year-old  male,  in no acute distress.  VITAL SIGNS: per nursing, reviewed and noted  SKIN: exposed skin with no rashes, ulcerations or petechiae.  EYES: perrla. EOMI.  ENT: Normal voice.  Patient maintained wearing a mask throughout patient encounter due to coronavirus pandemic  RESPIRATORY:  No increased work of breathing. No retractions.  Harsh rhonchorous cough  CARDIOVASCULAR:  regular rate and rhythm, no murmurs.  Good Peripheral pulses. Good cap refill to extremities.   GI: Abdomen soft, nontender, normal bowel sounds. No hernia. No ascites.  MUSCULOSKELETAL:  No tenderness. Full ROM. Strength and tone grossly normal.  no spasms. no neck or back tenderness or spasm.   NEUROLOGIC: Alert, oriented x 3. No gross deficits. GCS 15.   PSYCH: appropriate affect.  : no bladder tenderness or distention, no CVA tenderness      Procedures     No attending physician procedures were performed on this patient.      ED Course  ED Course as of 02/03/22 0245   Thu Feb 03, 2022   0244 FINDINGS: The heart is normal in size. The mediastinum is unremarkable.  Patchy bilateral airspace disease is consistent with Covid pneumonia.  There is no pneumothorax. There are no acute osseous abnormalities.     IMPRESSION:  Patchy bilateral airspace disease is consistent with Covid  pneumonia.           Images were reviewed, interpreted, and dictated by Dr. Salinas Pulido M.D.  Transcribed by Olivia Padgett PA-C.     This report was finalized on 1/24/2022 11:04 AM by Salinas Pulido M.D..          Specimen Collected: 01/24/22  09:43 Last Resulted: 01/24/22 11:04         [PF]      ED Course User Index  [PF] Ajay Ornelas,                                                  MDM  Patient presents with shortness of breath and cough 2 weeks out from covid.  Chest x-ray reveals perihilar infiltrates.  Will treat with doxycycline and Cheratussin AC.  Advise supportive care outpatient follow return precautions discussed.  Final diagnoses:   Community acquired pneumonia, unspecified laterality       ED Disposition  ED Disposition     ED Disposition Condition Comment    Discharge Stable           Mary Machado, JACKIE  [address] [place] [address] [phone]          [facility]  [address] [facility] [address] [phone]    As needed, If symptoms worsen         Medication List      New Prescriptions    doxycycline 100 MG capsule  Commonly known as: MONODOX  Take 1 capsule by mouth 2 (Two) Times a Day.     guaiFENesin-codeine 100-10 MG/5ML liquid  Commonly known as: GUAIFENESIN AC  Take 5 mL by mouth 4 (Four) Times a Day As Needed for Cough.           Where to Get Your Medications      These medications were sent to [facility] [place] - [address] - [phone]  - [phone] FX  [address] [place] [address]    Phone: [phone]   · doxycycline 100 MG capsule  · guaiFENesin-codeine 100-10 MG/5ML liquid          Ajay Ornelas DO  02/03/22 6326

## 2023-03-22 ENCOUNTER — TRANSCRIBE ORDERS (OUTPATIENT)
Dept: ADMINISTRATIVE | Facility: HOSPITAL | Age: 58
End: 2023-03-22
Payer: COMMERCIAL

## 2023-03-22 DIAGNOSIS — R79.81 LOW O2 SATURATION: Primary | ICD-10-CM

## 2023-03-23 ENCOUNTER — HOSPITAL ENCOUNTER (OUTPATIENT)
Dept: CT IMAGING | Facility: HOSPITAL | Age: 58
Discharge: HOME | End: 2023-03-23
Payer: COMMERCIAL

## 2023-03-23 DIAGNOSIS — R79.81 LOW O2 SATURATION: ICD-10-CM

## 2023-05-25 ENCOUNTER — OFFICE VISIT (OUTPATIENT)
Dept: PULMONOLOGY | Facility: CLINIC | Age: 58
End: 2023-05-25
Payer: COMMERCIAL

## 2023-05-25 VITALS
HEIGHT: 65 IN | RESPIRATION RATE: 18 BRPM | OXYGEN SATURATION: 96 % | DIASTOLIC BLOOD PRESSURE: 72 MMHG | WEIGHT: 203 LBS | SYSTOLIC BLOOD PRESSURE: 118 MMHG | BODY MASS INDEX: 33.82 KG/M2 | HEART RATE: 80 BPM

## 2023-05-25 DIAGNOSIS — R06.83 SNORING: ICD-10-CM

## 2023-05-25 DIAGNOSIS — E66.9 OBESITY (BMI 30-39.9): ICD-10-CM

## 2023-05-25 DIAGNOSIS — G47.19 EXCESSIVE DAYTIME SLEEPINESS: ICD-10-CM

## 2023-05-25 DIAGNOSIS — R06.2 WHEEZING: Primary | ICD-10-CM

## 2023-05-25 DIAGNOSIS — G47.33 OBSTRUCTIVE SLEEP APNEA: ICD-10-CM

## 2023-05-25 RX ORDER — INSULIN GLARGINE 100 [IU]/ML
INJECTION, SOLUTION SUBCUTANEOUS DAILY
COMMUNITY

## 2023-05-25 RX ORDER — SEMAGLUTIDE 1.34 MG/ML
INJECTION, SOLUTION SUBCUTANEOUS WEEKLY
COMMUNITY

## 2023-05-25 RX ORDER — ALBUTEROL SULFATE 90 UG/1
2 AEROSOL, METERED RESPIRATORY (INHALATION) EVERY 4 HOURS PRN
COMMUNITY

## 2023-05-25 RX ORDER — LORATADINE 10 MG/1
CAPSULE, LIQUID FILLED ORAL
COMMUNITY

## 2023-05-25 NOTE — PROGRESS NOTES
"  CONSULT NOTE    Requested by:   Mary Machado APRN Morgan, Sherra, APRN      Chief Complaint   Patient presents with   • Shortness of Breath   • Consult       Subjective:  Neftali Irvin is a 58 y.o. male.     History of Present Illness   Patient comes in today for consultation because of wheezing after he had a fall while cutting a tree 6 to 7 months ago.    The patient says that he fell backwards and does remember hitting his chest on the hill and although he only had some bruising for a few days, he started noticing occasional wheezing which was audible and became loud over the next few days therefore he was seen by his primary care provider who ordered a CT of the chest.    The patient says that since that spell, his wheezing has slowly improved and although he currently still hears occasional \"whistling sound\" from his lungs, he feels that this occurs rarely.    He denies any ongoing shortness of breath, cough or phlegm production.    Upon questioning the patient does mention daytime sleepiness, tiredness and fatigue.  He also snores loudly.    The patient actually was diagnosed with sleep apnea a few years ago but could not afford the CPAP device at [facility] many years ago.    When asked, he mentions at least some improvement, when he was compliant with a CPAP device for a few months.    The following portions of the patient's history were reviewed and updated as appropriate: allergies, current medications, past family history, past medical history, past social history and past surgical history.    Review of Systems   Constitutional: Negative for chills and fever.   HENT: Negative for sinus pressure, sneezing and sore throat.    Respiratory: Positive for wheezing. Negative for cough, chest tightness and shortness of breath.    Cardiovascular: Negative for palpitations and leg swelling.   All other systems reviewed and are negative.      Past Medical History:   Diagnosis Date   • Acid reflux  " "  • ADHD    • Arthritis    • Asthma    • Blood vessel problems in eyes     states eye doctor told him he had \"some bleeding\" behind his eyes due to DM   • Cataract, bilateral     no surgery yet   • Diabetes mellitus    • Elevated cholesterol    • Histoplasmosis     eye (doesn't remember laterality)   • Hx of exercise stress test    • Hypertension    • PONV (postoperative nausea and vomiting)    • Sleep apnea     non-compliant with CPAP   • Teeth missing    • Wears glasses        Social History     Tobacco Use   • Smoking status: Never   • Smokeless tobacco: Never   Substance Use Topics   • Alcohol use: No         Objective:  Visit Vitals  /72   Pulse 80   Resp 18   Ht 165.1 cm (65\") Comment: pt reported   Wt 92.1 kg (203 lb)   SpO2 96%   BMI 33.78 kg/m²       Physical Exam  Vitals reviewed.   Constitutional:       Appearance: He is well-developed.   HENT:      Head: Atraumatic.      Mouth/Throat:      Comments: Oropharynx was crowded.  Missing teeth noted.   Eyes:      Pupils: Pupils are equal, round, and reactive to light.   Neck:      Thyroid: No thyromegaly.      Vascular: No JVD.      Trachea: No tracheal deviation.      Comments: Increased neck circumference noted.  Cardiovascular:      Rate and Rhythm: Normal rate and regular rhythm.   Pulmonary:      Effort: Pulmonary effort is normal. No respiratory distress.      Breath sounds: Normal breath sounds.   Musculoskeletal:      Right lower leg: No edema.      Left lower leg: No edema.      Comments: Gait was normal.   Skin:     General: Skin is warm and dry.   Neurological:      Mental Status: He is alert and oriented to person, place, and time.   Psychiatric:         Mood and Affect: Mood normal.         Behavior: Behavior normal.         Assessment/Plan:  Diagnoses and all orders for this visit:    1. Wheezing (Primary)    2. Obstructive sleep apnea  -     Home Sleep Study; Future    3. Snoring  -     Home Sleep Study; Future    4. Excessive daytime " sleepiness  -     Home Sleep Study; Future    5. Obesity (BMI 30-39.9)  -     Home Sleep Study; Future        Return in about 3 months (around 8/16/2023) for Recheck, Sleep study, Give Sleep quest, For Linn Castañeda), ....Also 10 mths w/ Dr. Mcconnell.    DISCUSSION(if any):  Last chest x-ray was reviewed personally and the results were shared with the patient.  Images reviewed personally.   Results for orders placed during the hospital encounter of 01/23/22    XR Chest 1 View    Narrative  PROCEDURE: XR CHEST 1 VW-    HISTORY: persistent cough, recent covid    COMPARISON: None.    FINDINGS: The heart is normal in size. The mediastinum is unremarkable.  Patchy bilateral airspace disease is consistent with Covid pneumonia.  There is no pneumothorax. There are no acute osseous abnormalities.    Impression  Patchy bilateral airspace disease is consistent with Covid  pneumonia.        Images were reviewed, interpreted, and dictated by Dr. Salinas Pulido M.D.  Transcribed by Olivia Padgett PA-C.    This report was finalized on 1/24/2022 11:04 AM by Salinas Pulido M.D..    I reviewed the patient's latest CT scan, from March 2023.  It mentioned no significant pulmonary embolism or significant pulmonary disease.    I have also reviewed his ER note, from January 2022, that mentioned community-acquired pneumonia with a prescription of doxycycline and cough syrup.     ===========================  ===========================    Reviewed patient's latest echocardiogram, from 2 weeks ago.  It mentioned normal-sized left ventricle with severe left ventricular hypertrophy with estimated ejection fraction of 65%.  RVSP was measured at 16.    Laboratory workup also showed creatinine of 0.82.    ===========================  ===========================    I told the patient that his occasional wheezing may be from mild intermittent asthma.    Another possibility may be tracheal injury from his fall a few months ago but given  the fact that he is clinically better and only has minimal occasional episodes, we will follow him clinically before ordering further work-up.    If the patient has worsening symptoms, then we will consider PFTs and possibly CT of the neck +/- bronchoscopy for tracheal inspection.    Sleep questionnaire was provided to the patient    The pathophysiology of sleep apnea was discussed, with the patient.     We will encourage him to schedule the sleep study soon.     The patient was made aware of the limitation of the home sleep study, whereby it may underestimate the true AHI and also carries a low sensitivity.  I have informed him that even if the home sleep study is negative, we may suggest an in lab sleep study to completely and definitively rule out/in sleep apnea.  The patient has understood.  This was communicated to the patient, in case home study is to be requested.    The patient is agreeable to try CPAP/BiPAP, if needed.     Patient was educated on good sleep hygiene measures and voiced understanding of the same.     Patient was given reading material regarding sleep apnea    Patient was counseled regarding weight loss.     Dictated utilizing Dragon dictation.    This document was electronically signed by Kimberly Mcconnell MD on 05/25/23 at 08:56 EDT

## 2023-05-26 ENCOUNTER — PATIENT ROUNDING (BHMG ONLY) (OUTPATIENT)
Dept: PULMONOLOGY | Facility: CLINIC | Age: 58
End: 2023-05-26
Payer: COMMERCIAL

## 2023-08-24 ENCOUNTER — OFFICE VISIT (OUTPATIENT)
Dept: PULMONOLOGY | Facility: CLINIC | Age: 58
End: 2023-08-24
Payer: COMMERCIAL

## 2023-08-24 VITALS
WEIGHT: 189.8 LBS | DIASTOLIC BLOOD PRESSURE: 72 MMHG | HEART RATE: 84 BPM | HEIGHT: 65 IN | RESPIRATION RATE: 18 BRPM | OXYGEN SATURATION: 97 % | BODY MASS INDEX: 31.62 KG/M2 | SYSTOLIC BLOOD PRESSURE: 124 MMHG

## 2023-08-24 DIAGNOSIS — R06.2 WHEEZING: ICD-10-CM

## 2023-08-24 DIAGNOSIS — G47.33 OBSTRUCTIVE SLEEP APNEA: Primary | ICD-10-CM

## 2023-08-24 DIAGNOSIS — J45.20 MILD INTERMITTENT ASTHMA WITHOUT COMPLICATION: ICD-10-CM

## 2023-08-24 DIAGNOSIS — E66.9 OBESITY (BMI 30-39.9): ICD-10-CM

## 2023-08-24 PROCEDURE — 99214 OFFICE O/P EST MOD 30 MIN: CPT | Performed by: NURSE PRACTITIONER

## 2023-08-24 RX ORDER — ALBUTEROL SULFATE 90 UG/1
2 AEROSOL, METERED RESPIRATORY (INHALATION) EVERY 4 HOURS PRN
Qty: 18 G | Refills: 3 | Status: SHIPPED | OUTPATIENT
Start: 2023-08-24

## 2023-08-24 NOTE — PROGRESS NOTES
"Chief Complaint   Patient presents with    Breathing Problem    Follow-up         Subjective   Neftali Irvin is a 58 y.o. male.   The patient comes in today for f/u of MYRNA and SOB.     The sleep study shows moderate sleep apnea with AHI 16 per hour. He could not be reached to review sleep study so he has not been set up with CPAP.     He states he is okay with a CPAP machine if he does not have to pay for it because he is not working.     He has some SOB and states he has asthma. He is not sure what medications he is currently taking. He shows me a ventolin inhaler and states he uses it only occasionally.     He occasionally takes a deep inspiration which he states he started doing after he had a hard fall on his chest back in the winter. He had xrays which did not show any issues.     He feels his breathing issues have improved and this deep inspiration, that seems almost like a tic, has decreased as well. He states he does it more if he is nervous or around cats.       The following portions of the patient's history were reviewed and updated as appropriate: allergies, current medications, past family history, past medical history, past social history, and past surgical history.      Review of Systems   HENT:  Positive for sinus pressure. Negative for sneezing and sore throat.    Respiratory:  Positive for wheezing. Negative for cough, chest tightness and shortness of breath.    Cardiovascular:  Negative for palpitations and leg swelling.       Objective   Visit Vitals  /72   Pulse 84   Resp 18   Ht 165.1 cm (65\")   Wt 86.1 kg (189 lb 12.8 oz)   SpO2 97%   BMI 31.58 kg/mý       Physical Exam  Vitals reviewed.   HENT:      Head: Atraumatic.      Mouth/Throat:      Mouth: Mucous membranes are moist.   Eyes:      Extraocular Movements: Extraocular movements intact.   Neck:      Comments: No stridor.   Cardiovascular:      Rate and Rhythm: Normal rate and regular rhythm.   Pulmonary:      Effort: No " respiratory distress.      Breath sounds: No wheezing.   Abdominal:      Comments: Obese abdomen.    Musculoskeletal:      Comments: Gait normal.    Skin:     General: Skin is warm.   Neurological:      Mental Status: He is alert and oriented to person, place, and time.         Assessment & Plan   Diagnoses and all orders for this visit:    1. Obstructive sleep apnea (Primary)  -     BIPAP / CPAP Adjustment    2. Obesity (BMI 30-39.9)    3. Wheezing    4. Mild intermittent asthma without complication    Other orders  -     albuterol sulfate  (90 Base) MCG/ACT inhaler; Inhale 2 puffs Every 4 (Four) Hours As Needed for Wheezing.  Dispense: 18 g; Refill: 3           Return for keep appt in March .    DISCUSSION (if any):  I reviewed sleep study and discussed CPAP machine.  He is okay for me to order a CPAP machine but states if there is a co-pay required he does not know that he will be able to afford it.  He understands that he has sleep apnea That it should be treated.    Overall his shortness of breath is better.  He is not experiencing wheezing.  He still has this occasional and intermittent deep inspiration which seems almost like a tick however he states this is happening less often.  He is not needing to use the rescue inhaler on a regular basis.  He estimates using it a few times a month.    He may have experienced a tracheal injury from his fall a few months ago but given the fact that he is clinically better and only has minimal occasional episodes, we will follow him clinically before ordering further work-up.     If the patient has worsening symptoms, then we will consider PFTs and possibly CT of the neck +/- bronchoscopy for tracheal inspection.     Weight loss would be beneficial.    Dictated utilizing Dragon dictation.    This document was electronically signed by JACKIE Begum August 24, 2023  16:15 EDT

## 2023-09-14 DIAGNOSIS — G47.33 OSA (OBSTRUCTIVE SLEEP APNEA): Primary | ICD-10-CM

## 2023-12-05 ENCOUNTER — OFFICE VISIT (OUTPATIENT)
Dept: PULMONOLOGY | Facility: CLINIC | Age: 58
End: 2023-12-05
Payer: COMMERCIAL

## 2023-12-05 VITALS
OXYGEN SATURATION: 95 % | HEART RATE: 80 BPM | DIASTOLIC BLOOD PRESSURE: 78 MMHG | RESPIRATION RATE: 18 BRPM | BODY MASS INDEX: 31.82 KG/M2 | WEIGHT: 191 LBS | HEIGHT: 65 IN | SYSTOLIC BLOOD PRESSURE: 128 MMHG

## 2023-12-05 DIAGNOSIS — G47.33 OSA (OBSTRUCTIVE SLEEP APNEA): Primary | ICD-10-CM

## 2023-12-05 DIAGNOSIS — E66.9 OBESITY (BMI 30-39.9): ICD-10-CM

## 2023-12-05 DIAGNOSIS — J45.20 MILD INTERMITTENT ASTHMA WITHOUT COMPLICATION: ICD-10-CM

## 2023-12-05 PROCEDURE — 99214 OFFICE O/P EST MOD 30 MIN: CPT | Performed by: INTERNAL MEDICINE

## 2023-12-05 NOTE — PROGRESS NOTES
"  Chief Complaint   Patient presents with    Breathing Problem    Sleeping Problem    Follow-up         Subjective   Neftali Irvin is a 58 y.o. male.   Patient was evaluated today for follow up of asthma, and MYRNA.     Patient does not report any recent exacerbations requiring emergency room visits or hospitalizations.     Patient is compliant with pulmonary medicines, as prescribed.     he is using the rescue inhalers once or twice a week or less.     Patient was also evaluated today to discuss the results of Sleep study.     The patient has been set up with a device.  Since he was started on PAP device, he has noticed improvement in daytime sleepiness, fatigue & tiredness.       The following portions of the patient's history were reviewed and updated as appropriate: allergies, current medications, past family history, past medical history, past social history, and past surgical history.    Review of Systems   HENT:  Negative for sinus pressure, sneezing and sore throat.    Respiratory:  Positive for wheezing. Negative for cough, chest tightness and shortness of breath.        Objective   Visit Vitals  /78   Pulse 80   Resp 18   Ht 165.1 cm (65\") Comment: pt reported   Wt 86.6 kg (191 lb)   SpO2 95%   BMI 31.78 kg/m²         BMI Readings from Last 3 Encounters:   12/05/23 31.78 kg/m²   08/24/23 31.58 kg/m²   05/25/23 33.78 kg/m²       Physical Exam  Vitals reviewed.   Constitutional:       Appearance: He is well-developed.   HENT:      Head: Atraumatic.      Mouth/Throat:      Comments: Oropharynx was crowded.  Pulmonary:      Effort: Pulmonary effort is normal. No respiratory distress.      Breath sounds: Normal breath sounds. No wheezing or rales.   Musculoskeletal:      Comments: Gait was normal.   Neurological:      Mental Status: He is alert and oriented to person, place, and time.           Assessment & Plan   Diagnoses and all orders for this visit:    1. MYRNA (obstructive sleep apnea) " (Primary)    2. Obesity (BMI 30-39.9)    3. Mild intermittent asthma without complication  -     Spirometry With Bronchodilator; Future           Return in about 5 months (around 4/26/2024) for Cancel all other appts, Recheck, Spirometry F/U, For Linn Maddy).    DISCUSSION (if any):  It appears that his symptoms of asthma are under good control with the current regimen.    Patient's medications for underlying asthma were reviewed in great detail.    Compliance with medications stressed.     Side effects of prescribed medications discussed with the patient.    The need to continue to be aware of triggers that may cause asthma exacerbation versus progression of disease, was also discussed.    I have discussed asthma action plan with him.    The patient was asked to call this office if the symptoms worsen.    If he continues to have upper respiratory tract wheezing, we may need to ask ENT to see him.    I discussed the results of sleep study with the patient, in detail.     Sleep study type: Home  Performed in July 2023  AHI was 16 / hour.     Latest PAP device provided in September 2023.  DME company: [email or web address]    Current PAP settings: 6-14  Current mask type: Nasal mask.    I told the patient that he has obstructive sleep apnea based on the sleep study.  The symptoms also suggest the likelihood of obstructive sleep apnea.    Based on the above, we will encourage the patient to continue AutoPap at empiric pressure of 6-14 cm H2O.     I have asked him to call us back, if there are any issues with mask or the device.    Based on symptomatic response, patient may need either a full night titration study or empiric change in his CPAP pressures.     Patient was educated on good sleep hygiene measures and voiced understanding of the same.     Patient was counseled regarding compliance with the machine and appropriate care of mask and device was discussed.    Weight loss advised.    Vaccination status  addressed.    Declines influenza vaccinations.     Up-to-date with pneumonia vaccinations.     It was recommended that the patient consider Prevnar-20 vaccination and discuss it with his PCP, if not already obtained.     For the vaccines, that he refused today, I have asked him to discuss this further with his PCP.      Dictated utilizing Dragon dictation.    This document was electronically signed by Kimberly Mcconnell MD on 12/05/23 at 15:02 EST

## 2024-05-07 ENCOUNTER — OFFICE VISIT (OUTPATIENT)
Dept: PULMONOLOGY | Facility: CLINIC | Age: 59
End: 2024-05-07
Payer: COMMERCIAL

## 2024-05-07 VITALS
HEART RATE: 88 BPM | SYSTOLIC BLOOD PRESSURE: 126 MMHG | OXYGEN SATURATION: 97 % | HEIGHT: 65 IN | RESPIRATION RATE: 18 BRPM | DIASTOLIC BLOOD PRESSURE: 72 MMHG | BODY MASS INDEX: 32.99 KG/M2 | WEIGHT: 198 LBS

## 2024-05-07 DIAGNOSIS — J45.20 MILD INTERMITTENT ASTHMA WITHOUT COMPLICATION: ICD-10-CM

## 2024-05-07 DIAGNOSIS — G47.33 OSA (OBSTRUCTIVE SLEEP APNEA): Primary | ICD-10-CM

## 2024-05-07 DIAGNOSIS — R06.2 INSPIRATORY WHEEZE ON EXAMINATION: ICD-10-CM

## 2024-05-07 DIAGNOSIS — E66.9 OBESITY (BMI 30-39.9): ICD-10-CM

## 2024-05-07 PROCEDURE — 99214 OFFICE O/P EST MOD 30 MIN: CPT | Performed by: NURSE PRACTITIONER

## 2024-05-07 PROCEDURE — 94060 EVALUATION OF WHEEZING: CPT | Performed by: INTERNAL MEDICINE

## 2024-08-14 ENCOUNTER — LAB (OUTPATIENT)
Dept: LAB | Facility: HOSPITAL | Age: 59
End: 2024-08-14
Payer: COMMERCIAL

## 2024-08-14 ENCOUNTER — OFFICE VISIT (OUTPATIENT)
Dept: UROLOGY | Facility: CLINIC | Age: 59
End: 2024-08-14
Payer: COMMERCIAL

## 2024-08-14 VITALS
SYSTOLIC BLOOD PRESSURE: 140 MMHG | DIASTOLIC BLOOD PRESSURE: 90 MMHG | TEMPERATURE: 96.7 F | RESPIRATION RATE: 12 BRPM | HEIGHT: 65 IN | WEIGHT: 198.2 LBS | OXYGEN SATURATION: 97 % | HEART RATE: 71 BPM | BODY MASS INDEX: 33.02 KG/M2

## 2024-08-14 DIAGNOSIS — D75.1 POLYCYTHEMIA: Primary | ICD-10-CM

## 2024-08-14 DIAGNOSIS — E29.1 HYPOGONADISM IN MALE: ICD-10-CM

## 2024-08-14 DIAGNOSIS — N52.9 ERECTILE DYSFUNCTION, UNSPECIFIED ERECTILE DYSFUNCTION TYPE: ICD-10-CM

## 2024-08-14 PROBLEM — E11.42 DIABETIC PERIPHERAL NEUROPATHY ASSOCIATED WITH TYPE 2 DIABETES MELLITUS: Status: ACTIVE | Noted: 2024-04-26

## 2024-08-14 PROBLEM — E11.51 TYPE II DIABETES MELLITUS WITH PERIPHERAL CIRCULATORY DISORDER: Chronic | Status: ACTIVE | Noted: 2023-04-17

## 2024-08-14 PROBLEM — R19.5 HEME POSITIVE STOOL: Status: RESOLVED | Noted: 2020-12-31 | Resolved: 2024-08-14

## 2024-08-14 PROBLEM — M72.2 PLANTAR FASCIITIS: Status: ACTIVE | Noted: 2024-04-26

## 2024-08-14 PROBLEM — E78.5 HYPERLIPIDEMIA: Status: ACTIVE | Noted: 2023-04-17

## 2024-08-14 PROBLEM — I10 PRIMARY HYPERTENSION: Status: ACTIVE | Noted: 2023-04-17

## 2024-08-14 LAB
HCT VFR BLD AUTO: 46.2 % (ref 37.5–51)
HGB BLD-MCNC: 15.6 G/DL (ref 13–17.7)
LH SERPL-ACNC: 6.25 MIU/ML

## 2024-08-14 PROCEDURE — 84402 ASSAY OF FREE TESTOSTERONE: CPT

## 2024-08-14 PROCEDURE — 84403 ASSAY OF TOTAL TESTOSTERONE: CPT

## 2024-08-14 PROCEDURE — 83002 ASSAY OF GONADOTROPIN (LH): CPT | Performed by: NURSE PRACTITIONER

## 2024-08-14 PROCEDURE — 85018 HEMOGLOBIN: CPT | Performed by: NURSE PRACTITIONER

## 2024-08-14 PROCEDURE — 85014 HEMATOCRIT: CPT | Performed by: NURSE PRACTITIONER

## 2024-08-14 PROCEDURE — 36415 COLL VENOUS BLD VENIPUNCTURE: CPT

## 2024-08-14 RX ORDER — SILDENAFIL 25 MG/1
25 TABLET, FILM COATED ORAL DAILY PRN
Qty: 10 TABLET | Refills: 0 | Status: SHIPPED | OUTPATIENT
Start: 2024-08-14 | End: 2024-09-13

## 2024-08-14 RX ORDER — PROCHLORPERAZINE 25 MG/1
SUPPOSITORY RECTAL
COMMUNITY
Start: 2024-08-02

## 2024-08-14 RX ORDER — FENOFIBRATE 48 MG/1
48 TABLET, COATED ORAL DAILY
COMMUNITY

## 2024-08-14 RX ORDER — PROCHLORPERAZINE 25 MG/1
SUPPOSITORY RECTAL
COMMUNITY
Start: 2024-07-22

## 2024-08-14 NOTE — PROGRESS NOTES
"       Office Visit Erectile Dysfunction New     Patient Name: Neftali Irvin  : 1965   MRN: [id number]     Chief Complaint: Erectile Dysfunction.     Referring Provider: Mary Machado APRN    History of Present Illness: Neftali Irvin is a 59 y.o. male who presents today with history of hypogonadism previously on testosterone gel who presents with erectile dysfunction.  He reports that he feels really tired all the time.  He primarily has difficulty with achieving and maintaining an erection.  He has tried nothing in the past for his symptoms. ALEX is 7 today.     PCP checked testosterone level and it was 186; collected at 2:15 pm.  PSA was normal 3.1 ng/mL. HCT is elevated at 51.7.      Subjective      Review of System: ROS reviewed by me and is negative unless otherwise noted in HPI.      Past Medical History:   Past Medical History:   Diagnosis Date    Acid reflux     ADHD     Arthritis     Asthma     Blood vessel problems in eyes     states eye doctor told him he had \"some bleeding\" behind his eyes due to DM    Cataract, bilateral     no surgery yet    Colon polyps     Depression     Diabetes mellitus     Elevated cholesterol     Erectile dysfunction     Gout     High cholesterol     Histoplasmosis     eye (doesn't remember laterality)    Hx of exercise stress test     Hypertension     PONV (postoperative nausea and vomiting)     Sleep apnea     non-compliant with CPAP    Teeth missing     Wears glasses        Past Surgical History:   Past Surgical History:   Procedure Laterality Date    COLONOSCOPY N/A 2021    Procedure: COLONOSCOPY WITH HOT SNARE POLYPECTOMY AND TATTOO INJECTION, CLIP PLACEMENT X1;  Surgeon: Michael Marroquin MD;  Location: [facility] ENDOSCOPY;  Service: Gastroenterology;  Laterality: N/A;    FOOT SURGERY Right     pellet in foot    HERNIA REPAIR Left 2021    groin       Family History:   Family History   Problem Relation Age of Onset    Cancer Mother     Heart disease " Father     Cancer Maternal Aunt      He has no family history of prostate, bladder or kidney cancer.  There is a strong family history of cardiovascular disease.    Social History:   Social History     Socioeconomic History    Marital status:    Tobacco Use    Smoking status: Never    Smokeless tobacco: Never   Vaping Use    Vaping status: Never Used   Substance and Sexual Activity    Alcohol use: Never    Drug use: Never    Sexual activity: Yes     Partners: Female       Medications:     Current Outpatient Medications:     albuterol sulfate  (90 Base) MCG/ACT inhaler, Inhale 2 puffs Every 4 (Four) Hours As Needed for Wheezing., Disp: 18 g, Rfl: 3    amLODIPine (NORVASC) 10 MG tablet, , Disp: , Rfl:     Aspirin Low Dose 81 MG EC tablet, , Disp: , Rfl:     B-D UF III MINI PEN NEEDLES 31G X 5 MM misc, , Disp: , Rfl:     chlorthalidone (HYGROTON) 25 MG tablet, , Disp: , Rfl:     Continuous Glucose Sensor (Dexcom G6 Sensor), USE FOR CONTINUOUS GLUCOSE MONITORING CHANGING SENSOR EVERY 10 DAYS, PATIENT NEEDS APPT, Disp: , Rfl:     Continuous Glucose Transmitter (Dexcom G6 Transmitter) misc, USE FOR CONTINUOUS GLUCOSE MONITORING, CHANGING TRANSMITTER EVERY 90 DAYS, PATIENT NEEDS APPT FOR REFILLS, Disp: , Rfl:     dapagliflozin (FARXIGA) 5 MG tablet tablet, Take 1 tablet by mouth Daily., Disp: , Rfl:     fenofibrate (TRICOR) 48 MG tablet, Take 1 tablet by mouth Daily., Disp: , Rfl:     glipizide (GLUCOTROL XL) 2.5 MG 24 hr tablet, Take 1 tablet by mouth Daily., Disp: , Rfl:     insulin glargine (LANTUS, SEMGLEE) 100 UNIT/ML injection, Inject  under the skin into the appropriate area as directed Daily., Disp: , Rfl:     Lancets (OneTouch Delica Plus [id number]) misc, , Disp: , Rfl:     Loratadine 10 MG capsule, Take  by mouth., Disp: , Rfl:     losartan (COZAAR) 100 MG tablet, Take 1 tablet by mouth Daily., Disp: , Rfl:     meloxicam (MOBIC) 7.5 MG tablet, Take 1 tablet by mouth Daily., Disp: 14 tablet, Rfl:  "0    methocarbamol (ROBAXIN) 750 MG tablet, Take 1 tablet by mouth 3 (Three) Times a Day As Needed for Muscle Spasms., Disp: 21 tablet, Rfl: 0    OneTouch Verio test strip, , Disp: , Rfl:     Semaglutide, 1 MG/DOSE, (Ozempic, 1 MG/DOSE,) 2 MG/1.5ML solution pen-injector, Inject  under the skin into the appropriate area as directed 1 (One) Time Per Week., Disp: , Rfl:     Insulin Glargine (BASAGLAR KWIKPEN) 100 UNIT/ML injection pen, Inject 50 Units under the skin into the appropriate area as directed Daily., Disp: , Rfl:     sildenafil (Viagra) 25 MG tablet, Take 1 tablet by mouth Daily As Needed for Erectile Dysfunction for up to 30 days., Disp: 10 tablet, Rfl: 0    Allergies:   Allergies   Allergen Reactions    Atorvastatin Nausea Only    Metformin Other (See Comments)    Grapeseed Extract [Nutritional Supplements] Hives     Allergy to grapes    Milk-Related Compounds Other (See Comments)     Runny nose, post nasal drip    Other Other (See Comments)     Powder detergents cause blisters      Red Dye Hives    Strawberry Hives       Objective     Physical Exam:   Vital Signs:   Vitals:    08/14/24 0949   BP: 140/90   BP Location: Left arm   Patient Position: Sitting   Cuff Size: Adult   Pulse: 71   Resp: 12   Temp: 96.7 °F (35.9 °C)   TempSrc: Temporal   SpO2: 97%   Weight: 89.9 kg (198 lb 3.2 oz)   Height: 165.4 cm (65.12\")     Body mass index is 32.86 kg/m².   Physical Exam  Vitals and nursing note reviewed.   Constitutional:       General: He is not in acute distress.     Appearance: Normal appearance. He is obese. He is not ill-appearing.   HENT:      Head: Normocephalic and atraumatic.      Comments: Occasional audible stridor     Mouth/Throat:      Lips: Pink.      Dentition: Abnormal dentition (missing teeth).   Pulmonary:      Effort: Pulmonary effort is normal.   Skin:     General: Skin is warm and dry.   Neurological:      General: No focal deficit present.      Mental Status: He is alert and oriented to " person, place, and time.   Psychiatric:         Mood and Affect: Mood normal.         Behavior: Behavior normal.      Labs  Refer to HPI    Assessment / Plan      Assessment  Mr. Irvin is a 59 y.o. male with erectile dysfunction.  Risk factors include uncontrolled diabetes, hypertension.     He also admits to having profound fatigue and previous TRT. We dicussed that his testosterone labs were checked too late in the afternoon.  I plan to have Mr. Irvin repeat his testosterone labs this week prior to 10 am in the morning.  His PSA is normal and may be checked annually.      We also discussed that his HCT is elevated at 51.7. History of MYRNA with 100% CPAP compliance.  He drinks 1 glass of water per day and maybe an occasional soda.  He has reduced his soda intake from 7-8 per day to 1-2 every other week.  I have encouraged him to increase his water intake to at least 8, 8 ounce glasses per day.  If HCT remains high we discussed that he would not be a good candidate for TRT due to risk of worsening polycythemia.  He verbalized understanding of instructions today.      Plan  1.   I discussed treatment options including oral PDE5- medication, intra-urethral suppositories, pharmacologic injections, vacuum erection devices and implantable penile prostheses.    2. The patient was educated about PDE5.  He was counseled on appropriate use, timing and the need for sexual stimulation.  In addition, he understands not to use this medication with nitrates. He was instructed to take the medication about 1 hour prior to sexual activity.  Side effects were explained to the patient such as headache, visual changes, upset stomach, and back pain. Lastly, he was instructed to go immediately to his nearest emergency room in the event he developed an erection lasting longer than 3 hours. He voiced understanding of all these instructions and the importance of seeking prompt medical attention for an erection lasting longer than 3 hours.      3. We also discussed association of erectile dysfunction and future coronary events. Erectile dysfunction can be a marker for future coronary events and usually precedes by 2-4 years.  Recommended he discuss EKG and possible stress test with his PCP and following his cholesterol.     4. Repeat Testosterone labs prior to 10 am.  Once first set is completed I will reorder labs to be done 1 week prior to patients next follow up.  He was agreeable to this plan today.      Diagnoses and all orders for this visit:    1. Polycythemia (Primary)  -     Hemoglobin & Hematocrit, Blood    2. Erectile dysfunction, unspecified erectile dysfunction type  -     sildenafil (Viagra) 25 MG tablet; Take 1 tablet by mouth Daily As Needed for Erectile Dysfunction for up to 30 days.  Dispense: 10 tablet; Refill: 0    3. Hypogonadism in male  -     Testosterone, Free, Total; Future  -     Luteinizing Hormone  -     Cancel: PSA Diagnostic; Future    Follow Up:   Return in about 5 weeks (around 9/18/2024) for Next scheduled follow up, with Vicki with labs done prior.    JACKIE Calvert, MSN, FNP-C  [facility]

## 2024-08-17 LAB
TESTOST FREE SERPL-MCNC: 4.8 PG/ML (ref 7.2–24)
TESTOST SERPL-MCNC: 127 NG/DL (ref 264–916)

## 2024-08-19 DIAGNOSIS — E29.1 HYPOGONADISM IN MALE: Primary | ICD-10-CM

## 2024-09-16 ENCOUNTER — LAB (OUTPATIENT)
Dept: LAB | Facility: HOSPITAL | Age: 59
End: 2024-09-16
Payer: COMMERCIAL

## 2024-09-16 PROCEDURE — 84403 ASSAY OF TOTAL TESTOSTERONE: CPT | Performed by: NURSE PRACTITIONER

## 2024-09-16 PROCEDURE — 84402 ASSAY OF FREE TESTOSTERONE: CPT | Performed by: NURSE PRACTITIONER

## 2024-09-18 ENCOUNTER — OFFICE VISIT (OUTPATIENT)
Dept: UROLOGY | Facility: CLINIC | Age: 59
End: 2024-09-18
Payer: COMMERCIAL

## 2024-09-18 VITALS
BODY MASS INDEX: 31.79 KG/M2 | HEART RATE: 81 BPM | OXYGEN SATURATION: 95 % | TEMPERATURE: 96.9 F | HEIGHT: 65 IN | WEIGHT: 190.8 LBS

## 2024-09-18 DIAGNOSIS — F40.298 NEEDLE PHOBIA: ICD-10-CM

## 2024-09-18 DIAGNOSIS — E29.1 HYPOGONADISM IN MALE: Primary | ICD-10-CM

## 2024-09-18 PROCEDURE — 1159F MED LIST DOCD IN RCRD: CPT | Performed by: NURSE PRACTITIONER

## 2024-09-18 PROCEDURE — 1160F RVW MEDS BY RX/DR IN RCRD: CPT | Performed by: NURSE PRACTITIONER

## 2024-09-18 PROCEDURE — 99214 OFFICE O/P EST MOD 30 MIN: CPT | Performed by: NURSE PRACTITIONER

## 2024-09-18 RX ORDER — EZETIMIBE 10 MG/1
10 TABLET ORAL DAILY
COMMUNITY
Start: 2024-09-04

## 2024-09-18 RX ORDER — TESTOSTERONE ENANTHATE 75 MG/.5ML
75 INJECTION SUBCUTANEOUS
Qty: 2 ML | Refills: 0 | Status: SHIPPED | OUTPATIENT
Start: 2024-09-18

## 2024-10-11 DIAGNOSIS — F40.298 NEEDLE PHOBIA: ICD-10-CM

## 2024-10-11 DIAGNOSIS — E29.1 HYPOGONADISM IN MALE: ICD-10-CM

## 2024-10-11 RX ORDER — TESTOSTERONE ENANTHATE 75 MG/.5ML
75 INJECTION SUBCUTANEOUS
Qty: 2 ML | Refills: 0 | Status: SHIPPED | OUTPATIENT
Start: 2024-10-11

## 2024-11-08 DIAGNOSIS — F40.298 NEEDLE PHOBIA: ICD-10-CM

## 2024-11-08 DIAGNOSIS — E29.1 HYPOGONADISM IN MALE: ICD-10-CM

## 2024-11-08 RX ORDER — TESTOSTERONE ENANTHATE 75 MG/.5ML
75 INJECTION SUBCUTANEOUS
Qty: 2 ML | Refills: 0 | Status: SHIPPED | OUTPATIENT
Start: 2024-11-08

## 2024-11-08 NOTE — TELEPHONE ENCOUNTER
Patient is compliant with TRT testosterone replacement policy    Last OV visit      9/18/2024    Last LABS        9/16/2024    Next scheduled OV visit 12/18/2024    Refill due TODAY    Confirmed pharmacy  [facility]

## 2024-12-09 DIAGNOSIS — E29.1 HYPOGONADISM IN MALE: ICD-10-CM

## 2024-12-09 DIAGNOSIS — F40.298 NEEDLE PHOBIA: ICD-10-CM

## 2024-12-10 RX ORDER — TESTOSTERONE ENANTHATE 75 MG/.5ML
75 INJECTION SUBCUTANEOUS
Qty: 2 ML | Refills: 0 | Status: SHIPPED | OUTPATIENT
Start: 2024-12-10

## 2024-12-10 NOTE — TELEPHONE ENCOUNTER
Patient is complaiant with the testosterone policy.    Last OV : 09/18/24    Last Testo Lab: 09/16/24    Last H&H: 08/14/24    Last Refill: 11/08/24    Refill Due: 12/8/24

## 2024-12-11 ENCOUNTER — TRANSCRIBE ORDERS (OUTPATIENT)
Dept: LAB | Facility: HOSPITAL | Age: 59
End: 2024-12-11
Payer: COMMERCIAL

## 2024-12-11 ENCOUNTER — OFFICE VISIT (OUTPATIENT)
Dept: PULMONOLOGY | Facility: CLINIC | Age: 59
End: 2024-12-11
Payer: COMMERCIAL

## 2024-12-11 ENCOUNTER — LAB (OUTPATIENT)
Dept: LAB | Facility: HOSPITAL | Age: 59
End: 2024-12-11
Payer: COMMERCIAL

## 2024-12-11 VITALS
HEART RATE: 77 BPM | DIASTOLIC BLOOD PRESSURE: 68 MMHG | HEIGHT: 65 IN | SYSTOLIC BLOOD PRESSURE: 118 MMHG | OXYGEN SATURATION: 96 % | BODY MASS INDEX: 34.82 KG/M2 | RESPIRATION RATE: 14 BRPM | WEIGHT: 209 LBS

## 2024-12-11 DIAGNOSIS — I10 ESSENTIAL HYPERTENSION: ICD-10-CM

## 2024-12-11 DIAGNOSIS — E78.5 HYPERLIPIDEMIA, UNSPECIFIED HYPERLIPIDEMIA TYPE: ICD-10-CM

## 2024-12-11 DIAGNOSIS — E66.811 OBESITY, CLASS I, BMI 30.0-34.9 (SEE ACTUAL BMI): ICD-10-CM

## 2024-12-11 DIAGNOSIS — E11.319 TYPE 2 DIABETES MELLITUS WITH RETINOPATHY WITHOUT MACULAR EDEMA, UNSPECIFIED LATERALITY, UNSPECIFIED RETINOPATHY SEVERITY, UNSPECIFIED WHETHER LONG TERM INSULIN USE: ICD-10-CM

## 2024-12-11 DIAGNOSIS — E66.09 OTHER OBESITY DUE TO EXCESS CALORIES: ICD-10-CM

## 2024-12-11 DIAGNOSIS — J45.20 MILD INTERMITTENT ASTHMA WITHOUT COMPLICATION: ICD-10-CM

## 2024-12-11 DIAGNOSIS — G47.33 OSA (OBSTRUCTIVE SLEEP APNEA): Primary | ICD-10-CM

## 2024-12-11 DIAGNOSIS — E11.40 TYPE 2 DIABETES MELLITUS WITH DIABETIC NEUROPATHY, UNSPECIFIED WHETHER LONG TERM INSULIN USE: ICD-10-CM

## 2024-12-11 DIAGNOSIS — Z79.4 ENCOUNTER FOR LONG-TERM (CURRENT) USE OF INSULIN: ICD-10-CM

## 2024-12-11 DIAGNOSIS — E11.65 TYPE 2 DIABETES MELLITUS WITH HYPERGLYCEMIA, UNSPECIFIED WHETHER LONG TERM INSULIN USE: Primary | ICD-10-CM

## 2024-12-11 DIAGNOSIS — E11.65 TYPE 2 DIABETES MELLITUS WITH HYPERGLYCEMIA, UNSPECIFIED WHETHER LONG TERM INSULIN USE: ICD-10-CM

## 2024-12-11 LAB
25(OH)D3 SERPL-MCNC: 27.6 NG/ML (ref 30–100)
HBA1C MFR BLD: 8.5 % (ref 4.8–5.6)

## 2024-12-11 PROCEDURE — 99214 OFFICE O/P EST MOD 30 MIN: CPT | Performed by: INTERNAL MEDICINE

## 2024-12-11 PROCEDURE — 3078F DIAST BP <80 MM HG: CPT | Performed by: INTERNAL MEDICINE

## 2024-12-11 PROCEDURE — 82306 VITAMIN D 25 HYDROXY: CPT

## 2024-12-11 PROCEDURE — 85018 HEMOGLOBIN: CPT | Performed by: NURSE PRACTITIONER

## 2024-12-11 PROCEDURE — 3074F SYST BP LT 130 MM HG: CPT | Performed by: INTERNAL MEDICINE

## 2024-12-11 PROCEDURE — 84403 ASSAY OF TOTAL TESTOSTERONE: CPT | Performed by: NURSE PRACTITIONER

## 2024-12-11 PROCEDURE — 85014 HEMATOCRIT: CPT | Performed by: NURSE PRACTITIONER

## 2024-12-11 PROCEDURE — 84402 ASSAY OF FREE TESTOSTERONE: CPT | Performed by: NURSE PRACTITIONER

## 2024-12-11 PROCEDURE — 80053 COMPREHEN METABOLIC PANEL: CPT

## 2024-12-11 PROCEDURE — 83036 HEMOGLOBIN GLYCOSYLATED A1C: CPT

## 2024-12-11 PROCEDURE — 80061 LIPID PANEL: CPT

## 2024-12-11 RX ORDER — TIRZEPATIDE 2.5 MG/.5ML
INJECTION, SOLUTION SUBCUTANEOUS
COMMUNITY
Start: 2024-11-27

## 2024-12-11 RX ORDER — DAPAGLIFLOZIN 10 MG/1
TABLET, FILM COATED ORAL
COMMUNITY
Start: 2024-11-11

## 2024-12-11 NOTE — PROGRESS NOTES
"  Chief Complaint   Patient presents with    Sleeping Problem    Follow-up         Subjective   Neftali Irvin is a 59 y.o. male.   Patient was evaluated today for follow up of Sleep apnea.     Patient doesn't report any issues with the PAP device. The patient describes no significant issues with his mask either.     Patient says that the compliance with the use of the equipment is good.     Patient says that his symptoms of fatigue & daytime sleepiness have been helped greatly with the use of PAP, as prescribed.     Patient does not report any recent exacerbations requiring emergency room visits or hospitalizations.     Patient is compliant with pulmonary medicines, as prescribed.     he is using the rescue inhalers minimally.     The following portions of the patient's history were reviewed and updated as appropriate: allergies, current medications, past family history, past medical history, past social history, and past surgical history.    Review of Systems   HENT:  Negative for sinus pressure, sneezing and sore throat.    Respiratory:  Negative for cough, chest tightness, shortness of breath and wheezing.        Objective   Visit Vitals  /68   Pulse 77   Resp 14   Ht 165.4 cm (65.12\") Comment: pt reported   Wt 94.8 kg (209 lb)   SpO2 96%   BMI 34.65 kg/m²       BMI Readings from Last 8 Encounters:   12/11/24 34.65 kg/m²   09/18/24 31.63 kg/m²   08/14/24 32.86 kg/m²   05/07/24 32.95 kg/m²   12/05/23 31.78 kg/m²   08/24/23 31.58 kg/m²   05/25/23 33.78 kg/m²   01/23/22 34.78 kg/m²       Physical Exam  Vitals reviewed.   Constitutional:       Appearance: He is well-developed.   HENT:      Head: Atraumatic.      Mouth/Throat:      Comments: Oropharynx was crowded.  Pulmonary:      Effort: Pulmonary effort is normal. No respiratory distress.      Breath sounds: Normal breath sounds. No wheezing or rales.   Neurological:      Mental Status: He is alert and oriented to person, place, and time. "             Assessment & Plan   Diagnoses and all orders for this visit:    1. MYRNA (obstructive sleep apnea) (Primary)    2. Mild intermittent asthma without complication    3. Obesity, Class I, BMI 30.0-34.9 (see actual BMI)           Return in about 9 months (around 9/11/2025) for Recheck, For Linn Castañeda).    DISCUSSION (if any):  It appears that his symptoms of asthma are under adequate control with the current regimen.    Patient's medications for underlying asthma were reviewed in great detail.    He is clearly better with regards to inspiratory wheezing and we will consider ENT consult if needed in the future.     He was referred to ENT (Dr. Hidalgo) but could not be evaluated, when referral was sent in May 2024.    Compliance with medications stressed.     Side effects of prescribed medications discussed with the patient.    The need to continue to be aware of triggers that may cause asthma exacerbation versus progression of disease, was also discussed.    Sleep study performed in July 2023  AHI was 16 / hour.     Latest PAP device provided in Aug 2023  DME company: [employer]    Current PAP settings: 6-14  Current mask type: nasal mask.    Compliance data was obtained from the his device/DME company.    Continue PAP device on a regular basis, at least 4 hours or more per night.    Sleep hygiene measures were discussed    Weight loss advised.    Vaccination status addressed.    Declines influenza vaccinations.     Up-to-date with pneumonia vaccinations.     It was recommended that the patient consider Prevnar-20 vaccination and discuss it with his PCP, if not already obtained.     For the vaccines, that he refused today, I have asked him to discuss this further with his PCP.      Dictated utilizing Dragon dictation.    This document was electronically signed by Kimberly Mcconnell MD on 12/11/24 at 15:41 EST

## 2024-12-12 LAB
ALBUMIN SERPL-MCNC: 4.2 G/DL (ref 3.5–5.2)
ALBUMIN/GLOB SERPL: 1.1 G/DL
ALP SERPL-CCNC: 65 U/L (ref 39–117)
ALT SERPL W P-5'-P-CCNC: 18 U/L (ref 1–41)
ANION GAP SERPL CALCULATED.3IONS-SCNC: 11 MMOL/L (ref 5–15)
AST SERPL-CCNC: 33 U/L (ref 1–40)
BILIRUB SERPL-MCNC: 0.3 MG/DL (ref 0–1.2)
BUN SERPL-MCNC: 19 MG/DL (ref 6–20)
BUN/CREAT SERPL: 22.9 (ref 7–25)
CALCIUM SPEC-SCNC: 9.5 MG/DL (ref 8.6–10.5)
CHLORIDE SERPL-SCNC: 101 MMOL/L (ref 98–107)
CHOLEST SERPL-MCNC: 188 MG/DL (ref 0–200)
CO2 SERPL-SCNC: 24 MMOL/L (ref 22–29)
CREAT SERPL-MCNC: 0.83 MG/DL (ref 0.76–1.27)
EGFRCR SERPLBLD CKD-EPI 2021: 100.8 ML/MIN/1.73
GLOBULIN UR ELPH-MCNC: 3.8 GM/DL
GLUCOSE SERPL-MCNC: 105 MG/DL (ref 65–99)
HDLC SERPL-MCNC: 38 MG/DL (ref 40–60)
LDLC SERPL CALC-MCNC: 124 MG/DL (ref 0–100)
LDLC/HDLC SERPL: 3.18 {RATIO}
POTASSIUM SERPL-SCNC: 4.4 MMOL/L (ref 3.5–5.2)
PROT SERPL-MCNC: 8 G/DL (ref 6–8.5)
SODIUM SERPL-SCNC: 136 MMOL/L (ref 136–145)
TRIGL SERPL-MCNC: 146 MG/DL (ref 0–150)
VLDLC SERPL-MCNC: 26 MG/DL (ref 5–40)

## 2024-12-23 ENCOUNTER — OFFICE VISIT (OUTPATIENT)
Age: 59
End: 2024-12-23
Payer: COMMERCIAL

## 2024-12-23 VITALS
HEART RATE: 84 BPM | SYSTOLIC BLOOD PRESSURE: 142 MMHG | TEMPERATURE: 97.2 F | OXYGEN SATURATION: 96 % | RESPIRATION RATE: 15 BRPM | DIASTOLIC BLOOD PRESSURE: 82 MMHG | WEIGHT: 197 LBS | BODY MASS INDEX: 32.66 KG/M2

## 2024-12-23 DIAGNOSIS — D75.1 POLYCYTHEMIA: ICD-10-CM

## 2024-12-23 DIAGNOSIS — E29.1 HYPOGONADISM IN MALE: Primary | ICD-10-CM

## 2024-12-23 PROCEDURE — 99214 OFFICE O/P EST MOD 30 MIN: CPT | Performed by: NURSE PRACTITIONER

## 2024-12-23 PROCEDURE — 1160F RVW MEDS BY RX/DR IN RCRD: CPT | Performed by: NURSE PRACTITIONER

## 2024-12-23 PROCEDURE — 1159F MED LIST DOCD IN RCRD: CPT | Performed by: NURSE PRACTITIONER

## 2024-12-23 PROCEDURE — 3077F SYST BP >= 140 MM HG: CPT | Performed by: NURSE PRACTITIONER

## 2024-12-23 PROCEDURE — 3079F DIAST BP 80-89 MM HG: CPT | Performed by: NURSE PRACTITIONER

## 2024-12-23 RX ORDER — DEXTROMETHORPHAN HYDROBROMIDE AND PROMETHAZINE HYDROCHLORIDE 15; 6.25 MG/5ML; MG/5ML
5 SYRUP ORAL 4 TIMES DAILY PRN
COMMUNITY
Start: 2024-12-17

## 2024-12-23 RX ORDER — SODIUM CHLORIDE 0.65 %
1 AEROSOL, SPRAY (ML) NASAL AS NEEDED
COMMUNITY
Start: 2024-12-17

## 2024-12-23 NOTE — PROGRESS NOTES
Office Visit Follow Up      Patient Name: Neftali Irvin  : 1965   MRN: [id number]     Chief Complaint:   Chief Complaint   Patient presents with    Hypogonadism    Follow-up    Results     Referring Provider: No ref. provider found    History of Present Illness: Neftali Irvin is a 59 y.o. male who presents today for follow up with hypogonadism on TRT.  His total testosterone is 688; free testosterone is 13.4.  Hemoglobin and hematocrit are both elevated at 18.1/52.9.  He is overall feeling well aside from recovering from a head cold, he admits that he has not been able to wear his CPAP for a couple of weeks.      Subjective      Review of System:   As noted in HPI    Medications:     Current Outpatient Medications:     amoxicillin-clavulanate (AUGMENTIN) 875-125 MG per tablet, Take 1 tablet by mouth 2 (Two) Times a Day., Disp: , Rfl:     Aspirin Low Dose 81 MG EC tablet, , Disp: , Rfl:     B-D UF III MINI PEN NEEDLES 31G X 5 MM misc, , Disp: , Rfl:     chlorthalidone (HYGROTON) 25 MG tablet, , Disp: , Rfl:     Continuous Glucose Sensor (Dexcom G6 Sensor), USE FOR CONTINUOUS GLUCOSE MONITORING CHANGING SENSOR EVERY 10 DAYS, PATIENT NEEDS APPT, Disp: , Rfl:     Continuous Glucose Transmitter (Dexcom G6 Transmitter) misc, USE FOR CONTINUOUS GLUCOSE MONITORING, CHANGING TRANSMITTER EVERY 90 DAYS, PATIENT NEEDS APPT FOR REFILLS, Disp: , Rfl:     Deep Sea Nasal Spray 0.65 % nasal spray, Administer 1 spray into the nostril(s) as directed by provider As Needed for Congestion., Disp: , Rfl:     ezetimibe (ZETIA) 10 MG tablet, Take 1 tablet by mouth Daily., Disp: , Rfl:     Farxiga 10 MG tablet, TAKE ONE (1) TABLET BY MOUTH EACH MORNING, Disp: , Rfl:     Insulin Glargine (BASAGLAR KWIKPEN) 100 UNIT/ML injection pen, Inject 50 Units under the skin into the appropriate area as directed Daily., Disp: , Rfl:     insulin glargine (LANTUS, SEMGLEE) 100 UNIT/ML injection, Inject  under the skin into the  appropriate area as directed Daily., Disp: , Rfl:     Lancets (OneTouch Delica Plus [id number]) misc, , Disp: , Rfl:     Loratadine 10 MG capsule, Take  by mouth., Disp: , Rfl:     losartan (COZAAR) 100 MG tablet, Take 1 tablet by mouth Daily., Disp: , Rfl:     meloxicam (MOBIC) 7.5 MG tablet, Take 1 tablet by mouth Daily., Disp: 14 tablet, Rfl: 0    methocarbamol (ROBAXIN) 750 MG tablet, Take 1 tablet by mouth 3 (Three) Times a Day As Needed for Muscle Spasms., Disp: 21 tablet, Rfl: 0    Mounjaro 2.5 MG/0.5ML solution auto-injector, INJECT 2.5MG UNDER THE SKIN EVERY WEEK FOR FOUR WEEKS, Disp: , Rfl:     OneTouch Verio test strip, , Disp: , Rfl:     promethazine-dextromethorphan (PROMETHAZINE-DM) 6.25-15 MG/5ML syrup, Take 5 mL by mouth 4 (Four) Times a Day As Needed for Cough., Disp: , Rfl:     Testosterone Enanthate (Xyosted) 75 MG/0.5ML solution auto-injector, INJECT 75 MG UNDER THE SKIN INTO THE APPROPRIATE AREA AS DIRECTED EVERY 7 (SEVEN) DAYS., Disp: 2 mL, Rfl: 0    albuterol sulfate  (90 Base) MCG/ACT inhaler, Inhale 2 puffs Every 4 (Four) Hours As Needed for Wheezing. (Patient not taking: Reported on 12/23/2024), Disp: 18 g, Rfl: 3    dapagliflozin (FARXIGA) 5 MG tablet tablet, Take 1 tablet by mouth Daily., Disp: , Rfl:     Semaglutide, 1 MG/DOSE, (Ozempic, 1 MG/DOSE,) 2 MG/1.5ML solution pen-injector, Inject  under the skin into the appropriate area as directed 1 (One) Time Per Week. (Patient not taking: Reported on 12/23/2024), Disp: , Rfl:     sildenafil (Viagra) 25 MG tablet, Take 1 tablet by mouth Daily As Needed for Erectile Dysfunction for up to 30 days., Disp: 10 tablet, Rfl: 0    Allergies:   Allergies   Allergen Reactions    Atorvastatin Nausea Only    Grapeseed Extract [Nutritional Supplements] Hives     Allergy to grapes    Metformin Other (See Comments)     Multiple issues specified by patient     Milk-Related Compounds Other (See Comments)     Runny nose, post nasal drip    Other Other  (See Comments)     Powder detergents cause blisters      Red Dye #40 (Allura Red) Hives    Strawberry Hives       Objective   Physical Exam:   Vital Signs:   Vitals:    12/23/24 1001   BP: 142/82   BP Location: Left arm   Patient Position: Sitting   Cuff Size: Adult   Pulse: 84   Resp: 15   Temp: 97.2 °F (36.2 °C)   TempSrc: Temporal   SpO2: 96%   Weight: 89.4 kg (197 lb)     Body mass index is 32.66 kg/m².     Physical Exam  Vitals and nursing note reviewed.   Constitutional:       General: He is not in acute distress.     Appearance: Normal appearance. He is not ill-appearing.   HENT:      Head: Normocephalic and atraumatic.   Pulmonary:      Effort: Pulmonary effort is normal.   Skin:     General: Skin is warm and dry.   Neurological:      General: No focal deficit present.      Mental Status: He is alert and oriented to person, place, and time.   Psychiatric:         Mood and Affect: Mood normal.         Behavior: Behavior normal.      Labs:   Brief Urine Lab Results       None          Lab Results   Component Value Date    GLUCOSE 105 (H) 12/11/2024    CALCIUM 9.5 12/11/2024     12/11/2024    K 4.4 12/11/2024    CO2 24.0 12/11/2024     12/11/2024    BUN 19 12/11/2024    CREATININE 0.83 12/11/2024    BCR 22.9 12/11/2024    ANIONGAP 11.0 12/11/2024     Lab Results   Component Value Date    HGB 18.1 (H) 12/11/2024    HCT 52.9 (H) 12/11/2024     Images:   No Images in the past 120 days found.      Assessment / Plan      Assessment/Plan: Neftali Irvin is a 59 y.o. male who presents today for follow up with hypogonadism on TRT.  His total testosterone is 688; free testosterone is 13.4.  Hemoglobin and hematocrit are both elevated at 18.1/52.9.  He has no urologic complaints today. He is recovering from a cold.  He has not been able to wear his CPAP consistently due to nasal congestion and coughing.      We discussed that his H/H is elevated which is called polycythemia. We discussed that he needs to  have therapeutic phlebotomy and push water.  He was encouraged to wear his CPAP as much as possible while sleeping and for naps.  This is likely contributing to his polycythemia.  I have scheduled him for therapeutic phlebotomy on Friday 12/27/2024 at 2:30 pm at [facility] infusion center.  He will have therapeutic phlebotomy performed quarterly at this time.      I will have Mr. Irvin return for follow up in 6 months with his labs performed on Tuesday June 10th prior to 10 am.  Will check annual PSA at next follow up.  He was given a handout with his appointment times.      Diagnoses and all orders for this visit:    1. Hypogonadism in male (Primary)  -     Hemoglobin & Hematocrit, Blood; Future  -     Testosterone, Free, Total; Future  -     PSA Diagnostic; Future    2. Polycythemia  -     Ambulatory Referral to [facility] For Infusion Treatment  -     Hemoglobin & Hematocrit, Blood; Future     Follow Up:   Return in about 6 months (around 6/23/2025) for Next scheduled follow up, with Vicki with labs done June 10th prior to 10 am.    JACKIE Low, NP-C  [facility] [place]

## 2024-12-27 ENCOUNTER — HOSPITAL ENCOUNTER (OUTPATIENT)
Facility: HOSPITAL | Age: 59
Discharge: HOME | End: 2024-12-27
Payer: COMMERCIAL

## 2024-12-27 VITALS
OXYGEN SATURATION: 95 % | TEMPERATURE: 98.1 F | RESPIRATION RATE: 16 BRPM | HEART RATE: 89 BPM | DIASTOLIC BLOOD PRESSURE: 82 MMHG | SYSTOLIC BLOOD PRESSURE: 151 MMHG

## 2024-12-27 DIAGNOSIS — D75.1 POLYCYTHEMIA: Primary | ICD-10-CM

## 2024-12-27 LAB
HCT VFR BLD AUTO: 52.9 % (ref 37.5–51)
HGB BLD-MCNC: 18.4 G/DL (ref 13–17.7)

## 2024-12-27 PROCEDURE — 85014 HEMATOCRIT: CPT | Performed by: NURSE PRACTITIONER

## 2024-12-27 PROCEDURE — 85018 HEMOGLOBIN: CPT | Performed by: NURSE PRACTITIONER

## 2024-12-27 PROCEDURE — 99195 PHLEBOTOMY: CPT

## 2025-01-07 DIAGNOSIS — E29.1 HYPOGONADISM IN MALE: ICD-10-CM

## 2025-01-07 DIAGNOSIS — F40.298 NEEDLE PHOBIA: ICD-10-CM

## 2025-01-08 RX ORDER — TESTOSTERONE ENANTHATE 75 MG/.5ML
75 INJECTION SUBCUTANEOUS
Qty: 2 ML | Refills: 0 | Status: SHIPPED | OUTPATIENT
Start: 2025-01-08

## 2025-01-08 NOTE — TELEPHONE ENCOUNTER
Patient is compliant with TRT testosterone replacement policy.     Last OV visit      09/18/2024    Last LABS        12/11--TT, 12/27 H&H    Next scheduled OV visit 06/30/2025    Refill due now 1/7/25    Confirmed pharmacy  [facility]     Patient got PHLEB completed on 12/27/24

## 2025-02-03 DIAGNOSIS — F40.298 NEEDLE PHOBIA: ICD-10-CM

## 2025-02-03 DIAGNOSIS — E29.1 HYPOGONADISM IN MALE: ICD-10-CM

## 2025-02-06 RX ORDER — TESTOSTERONE ENANTHATE 75 MG/.5ML
75 INJECTION SUBCUTANEOUS
Qty: 2 ML | Refills: 0 | Status: SHIPPED | OUTPATIENT
Start: 2025-02-06

## 2025-03-05 DIAGNOSIS — E29.1 HYPOGONADISM IN MALE: ICD-10-CM

## 2025-03-05 DIAGNOSIS — F40.298 NEEDLE PHOBIA: ICD-10-CM

## 2025-03-07 RX ORDER — TESTOSTERONE ENANTHATE 75 MG/.5ML
75 INJECTION SUBCUTANEOUS
Qty: 2 ML | Refills: 0 | Status: SHIPPED | OUTPATIENT
Start: 2025-03-07

## 2025-03-07 NOTE — TELEPHONE ENCOUNTER
Patient is compliant with TRT testosterone replacement policy    Last OV visit      09/18/2024    Last LABS        12/11/2024    Next scheduled OV visit 06/30/2025    Refill due NOW     Confirmed pharmacy  [facility]

## 2025-03-28 ENCOUNTER — HOSPITAL ENCOUNTER (OUTPATIENT)
Facility: HOSPITAL | Age: 60
Discharge: HOME | End: 2025-03-28
Payer: COMMERCIAL

## 2025-03-28 VITALS
SYSTOLIC BLOOD PRESSURE: 147 MMHG | DIASTOLIC BLOOD PRESSURE: 82 MMHG | RESPIRATION RATE: 18 BRPM | OXYGEN SATURATION: 98 % | HEART RATE: 82 BPM | TEMPERATURE: 97.8 F

## 2025-03-28 DIAGNOSIS — D75.1 POLYCYTHEMIA: Primary | ICD-10-CM

## 2025-03-28 LAB
HCT VFR BLD AUTO: 54.9 % (ref 37.5–51)
HGB BLD-MCNC: 18.6 G/DL (ref 13–17.7)

## 2025-03-28 PROCEDURE — 99195 PHLEBOTOMY: CPT

## 2025-03-28 PROCEDURE — 85014 HEMATOCRIT: CPT | Performed by: NURSE PRACTITIONER

## 2025-03-28 PROCEDURE — 85018 HEMOGLOBIN: CPT | Performed by: NURSE PRACTITIONER

## 2025-04-02 DIAGNOSIS — F40.298 NEEDLE PHOBIA: ICD-10-CM

## 2025-04-02 DIAGNOSIS — E29.1 HYPOGONADISM IN MALE: ICD-10-CM

## 2025-04-02 RX ORDER — TESTOSTERONE ENANTHATE 75 MG/.5ML
75 INJECTION SUBCUTANEOUS
Qty: 2 ML | Refills: 0 | OUTPATIENT
Start: 2025-04-02

## 2025-04-02 NOTE — TELEPHONE ENCOUNTER
Patient is compliant with TRT testosterone replacement policy    Last OV visit     09/18/2024      Last LABS        12/11/2024    Next scheduled OV visit 04/9/2025    Refill due 4/4/2025    Confirmed pharmacy  [facility]

## 2025-04-02 NOTE — TELEPHONE ENCOUNTER
Caller: SARA FARNSWORTH    Relationship: [employer]    Best call back number: [phone]    Requested Prescriptions: Testosterone Enanthate (Xyosted) 75 MG/0.5ML solution auto-injector  Requested Prescriptions      No prescriptions requested or ordered in this encounter        Pharmacy where request should be sent:  [facility] - [place], [place] - [address] - [phone]  - [phone] FX  [address] [place] [address]  Phone: [phone] Fax: [phone]  Hours: Not open 24 hours        Last office visit with prescribing clinician: 12/23/2024   Last telemedicine visit with prescribing clinician: Visit date not found   Next office visit with prescribing clinician: 4/9/2025     Additional details provided by patient:     Does the patient have less than a 3 day supply:  [x] Yes  [] No    Would you like a call back once the refill request has been completed: [x] Yes [] No    If the office needs to give you a call back, can they leave a voicemail: [x] Yes [] No    Guevara Herzog Rep   04/02/25 13:28 EDT

## 2025-04-07 DIAGNOSIS — E29.1 HYPOGONADISM IN MALE: ICD-10-CM

## 2025-04-07 DIAGNOSIS — F40.298 NEEDLE PHOBIA: ICD-10-CM

## 2025-04-08 RX ORDER — TESTOSTERONE ENANTHATE 75 MG/.5ML
75 INJECTION SUBCUTANEOUS
Qty: 2 ML | Refills: 0 | Status: SHIPPED | OUTPATIENT
Start: 2025-04-08 | End: 2025-04-09

## 2025-04-08 NOTE — TELEPHONE ENCOUNTER
Patient is compliant with TRT testosterone replacement policy    Last OV visit      09/18/2024     Last LABS        12/11/2024    Next scheduled OV visit 04/09/2025    Refill due NOW    Confirmed pharmacy  [facility]     Last PHLEB appt done 3/28/2025

## 2025-04-09 ENCOUNTER — OFFICE VISIT (OUTPATIENT)
Dept: UROLOGY | Facility: CLINIC | Age: 60
End: 2025-04-09
Payer: COMMERCIAL

## 2025-04-09 VITALS
SYSTOLIC BLOOD PRESSURE: 152 MMHG | BODY MASS INDEX: 32.82 KG/M2 | DIASTOLIC BLOOD PRESSURE: 84 MMHG | WEIGHT: 197 LBS | HEIGHT: 65 IN | OXYGEN SATURATION: 96 % | TEMPERATURE: 96.9 F | HEART RATE: 76 BPM | RESPIRATION RATE: 14 BRPM

## 2025-04-09 DIAGNOSIS — N52.9 ERECTILE DYSFUNCTION, UNSPECIFIED ERECTILE DYSFUNCTION TYPE: ICD-10-CM

## 2025-04-09 DIAGNOSIS — E29.1 HYPOGONADISM IN MALE: Primary | ICD-10-CM

## 2025-04-09 DIAGNOSIS — Z78.9 ADVISED ABOUT MANAGEMENT OF WEIGHT: ICD-10-CM

## 2025-04-09 DIAGNOSIS — D75.1 POLYCYTHEMIA: ICD-10-CM

## 2025-04-09 RX ORDER — CHOLECALCIFEROL (VITAMIN D3) 25 MCG
1 TABLET ORAL DAILY
COMMUNITY
Start: 2025-03-11

## 2025-04-09 RX ORDER — TESTOSTERONE ENANTHATE 75 MG/.5ML
75 INJECTION SUBCUTANEOUS
Qty: 2 ML | Refills: 0 | Status: SHIPPED | OUTPATIENT
Start: 2025-04-09

## 2025-04-09 RX ORDER — SILDENAFIL 25 MG/1
25 TABLET, FILM COATED ORAL AS NEEDED
Qty: 20 TABLET | Refills: 3 | Status: SHIPPED | OUTPATIENT
Start: 2025-04-09

## 2025-04-09 NOTE — PROGRESS NOTES
"       Office Visit     Patient Name: Neftali Irvin  : 1965   MRN: [id number]   Patient or patient representative verbalized consent for the use of Ambient Listening during the visit with  JACKIE Ledesma for chart documentation. 2025  11:24 EDT    Chief Complaint   Patient presents with    Follow-up    Results     H&H RESULTS      Referring Provider: No ref. provider found    Primary Care Provider: Mary Machado APRN     History of Present Illness  Mr. Irvin is a 60 year old male who presents for evaluation of elevated hematocrit, and TRT management and ED management.     Polycythemia  - Persistent elevation in hematocrit levels despite regular blood donations  - Most recent donation was 1 week ago  - Has sleep apnea and uses a CPAP machine nightly with self-adjusting AutoPap settings  - Receives testosterone injections every Tuesday    Erectile Dysfunction  - Viagra has been somewhat effective    Weight Management  - On a weight loss regimen with weekly injections  - Has not observed significant weight loss despite increased dosage  - Consumes one low-fat meal per day and walks regularly but has not noticed weight changes  - Does not drink beer but has abdominal fat  - Recalls a past gastroenterologist consultation where polyps were removed and was advised to follow up after a year, which did not occur    Subjective   Review of System: As noted in HPI.    Past Medical History:   Diagnosis Date    Acid reflux     ADHD     Arthritis     Asthma     Blood vessel problems in eyes     states eye doctor told him he had \"some bleeding\" behind his eyes due to DM    Cataract, bilateral     no surgery yet    Colon polyps     Depression     Diabetes mellitus     Elevated cholesterol     Erectile dysfunction     Gout     High cholesterol     Histoplasmosis     eye (doesn't remember laterality)    Hx of exercise stress test     Hypertension     PONV (postoperative nausea and vomiting)     Sleep apnea     " non-compliant with CPAP    Teeth missing     Wears glasses      Past Surgical History:   Procedure Laterality Date    COLONOSCOPY N/A 03/11/2021    Procedure: COLONOSCOPY WITH HOT SNARE POLYPECTOMY AND TATTOO INJECTION, CLIP PLACEMENT X1;  Surgeon: Michael Marroquin MD;  Location: [facility] ENDOSCOPY;  Service: Gastroenterology;  Laterality: N/A;    FOOT SURGERY Right     pellet in foot    HERNIA REPAIR Left 06/2021    groin     Family History   Problem Relation Age of Onset    Heart disease Father     Cancer Mother 44        kidney    Cancer Maternal Aunt     Prostate cancer Neg Hx      Social History     Socioeconomic History    Marital status:    Tobacco Use    Smoking status: Never    Smokeless tobacco: Never   Vaping Use    Vaping status: Never Used   Substance and Sexual Activity    Alcohol use: Never    Drug use: Never    Sexual activity: Yes     Partners: Female       Current Outpatient Medications:     albuterol sulfate  (90 Base) MCG/ACT inhaler, Inhale 2 puffs Every 4 (Four) Hours As Needed for Wheezing., Disp: 18 g, Rfl: 3    Aspirin Low Dose 81 MG EC tablet, , Disp: , Rfl:     B-D UF III MINI PEN NEEDLES 31G X 5 MM misc, , Disp: , Rfl:     chlorthalidone (HYGROTON) 25 MG tablet, , Disp: , Rfl:     cholecalciferol 25 MCG tablet, Take 1 tablet by mouth Daily., Disp: , Rfl:     Continuous Glucose Sensor (Dexcom G6 Sensor), USE FOR CONTINUOUS GLUCOSE MONITORING CHANGING SENSOR EVERY 10 DAYS, PATIENT NEEDS APPT, Disp: , Rfl:     Continuous Glucose Transmitter (Dexcom G6 Transmitter) misc, USE FOR CONTINUOUS GLUCOSE MONITORING, CHANGING TRANSMITTER EVERY 90 DAYS, PATIENT NEEDS APPT FOR REFILLS, Disp: , Rfl:     Deep Sea Nasal Spray 0.65 % nasal spray, Administer 1 spray into the nostril(s) as directed by provider As Needed for Congestion., Disp: , Rfl:     ezetimibe (ZETIA) 10 MG tablet, Take 1 tablet by mouth Daily., Disp: , Rfl:     Farxiga 10 MG tablet, TAKE ONE (1) TABLET BY MOUTH EACH MORNING,  Disp: , Rfl:     Insulin Glargine (BASAGLAR KWIKPEN) 100 UNIT/ML injection pen, Inject 50 Units under the skin into the appropriate area as directed Daily., Disp: , Rfl:     insulin glargine (LANTUS, SEMGLEE) 100 UNIT/ML injection, Inject 40 Units under the skin into the appropriate area as directed Daily., Disp: , Rfl:     Lancets (OneTouch Delica Plus Ffaqpx60E) misc, , Disp: , Rfl:     Loratadine 10 MG capsule, Take  by mouth., Disp: , Rfl:     losartan (COZAAR) 100 MG tablet, Take 1 tablet by mouth Daily., Disp: , Rfl:     meloxicam (MOBIC) 7.5 MG tablet, Take 1 tablet by mouth Daily., Disp: 14 tablet, Rfl: 0    OneTouch Verio test strip, , Disp: , Rfl:     methocarbamol (ROBAXIN) 750 MG tablet, Take 1 tablet by mouth 3 (Three) Times a Day As Needed for Muscle Spasms. (Patient not taking: Reported on 4/9/2025), Disp: 21 tablet, Rfl: 0    Mounjaro 2.5 MG/0.5ML solution auto-injector, INJECT 2.5MG UNDER THE SKIN EVERY WEEK FOR FOUR WEEKS (Patient not taking: Reported on 4/9/2025), Disp: , Rfl:     Semaglutide, 1 MG/DOSE, (Ozempic, 1 MG/DOSE,) 2 MG/1.5ML solution pen-injector, Inject  under the skin into the appropriate area as directed 1 (One) Time Per Week. (Patient not taking: Reported on 4/9/2025), Disp: , Rfl:     sildenafil (VIAGRA) 25 MG tablet, Take 1 tablet by mouth As Needed for Erectile Dysfunction., Disp: 20 tablet, Rfl: 3    Testosterone Enanthate (Xyosted) 75 MG/0.5ML solution auto-injector, Inject 75 mg under the skin into the appropriate area as directed Every 7 (Seven) Days., Disp: 2 mL, Rfl: 0    Allergies   Allergen Reactions    Atorvastatin Nausea Only    Grapeseed Extract [Nutritional Supplements] Hives     Allergy to grapes    Metformin Other (See Comments)     Multiple issues specified by patient     Milk-Related Compounds Other (See Comments)     Runny nose, post nasal drip    Other Other (See Comments)     Powder detergents cause blisters      Red Dye #40 (Allura Red) Hives    Strawberry  "Hives     Objective   Visit Vitals  /84 (BP Location: Right arm, Patient Position: Sitting, Cuff Size: Adult)   Pulse 76   Temp 96.9 °F (36.1 °C) (Temporal)   Resp 14   Ht 165.4 cm (65.12\")   Wt 89.4 kg (197 lb)   SpO2 96%   BMI 32.66 kg/m²      Body mass index is 32.66 kg/m².   Physical Exam  Vitals and nursing note reviewed.   Constitutional:       General: He is not in acute distress.     Appearance: Normal appearance. He is obese. He is not ill-appearing.   HENT:      Head: Normocephalic and atraumatic.      Comments: Occasional auditory throat whistle   Pulmonary:      Effort: Pulmonary effort is normal.   Abdominal:      General: Abdomen is protuberant.   Skin:     General: Skin is warm and dry.   Neurological:      General: No focal deficit present.      Mental Status: He is alert and oriented to person, place, and time.   Psychiatric:         Mood and Affect: Mood normal.         Behavior: Behavior normal.          Labs    Lab Results   Component Value Date    HGB 18.6 (H) 03/28/2025    HCT 54.9 (H) 03/28/2025     Lab Results   Component Value Date    GLUCOSE 105 (H) 12/11/2024    CALCIUM 9.5 12/11/2024     12/11/2024    K 4.4 12/11/2024    CO2 24.0 12/11/2024     12/11/2024    BUN 19 12/11/2024    CREATININE 0.83 12/11/2024    EGFR 100.8 12/11/2024    BCR 22.9 12/11/2024    ANIONGAP 11.0 12/11/2024    ALT 18 12/11/2024    AST 33 12/11/2024     Lab Results   Component Value Date    HGBA1C 8.50 (H) 12/11/2024     No results found for: \"PSA\", \"PCTFREEPSA\", \"PROSTATEBIO\"  No results found for: \"URICACIDSTN\", \"LJLB6WOHWHK\", \"ZQHT3ACDLP\", \"LABMAGN\"  Lab Results   Component Value Date    TUOJ27CV 27.6 (L) 12/11/2024     Lab Results   Component Value Date    TESTOSTEROTT 688 12/11/2024    TESTOSTEROTT 277 09/16/2024    TESTOSTEROTT 127 (L) 08/14/2024    TESTFRE 13.4 12/11/2024    TESTFRE 5.6 (L) 09/16/2024    TESTFRE 4.8 (L) 08/14/2024    LH 6.25 08/14/2024       Radiographic Studies  No Images in " the past 120 days found..    I have reviewed the above labs and imaging.    Assessment / Plan    Diagnoses and all orders for this visit:    1. Hypogonadism in male (Primary)  -     Testosterone Enanthate (Xyosted) 75 MG/0.5ML solution auto-injector; Inject 75 mg under the skin into the appropriate area as directed Every 7 (Seven) Days.  Dispense: 2 mL; Refill: 0  -     Testosterone; Future  -     Hemoglobin & Hematocrit, Blood; Future    2. Polycythemia  -     Hemoglobin & Hematocrit, Blood; Future    3. Erectile dysfunction, unspecified erectile dysfunction type  -     sildenafil (VIAGRA) 25 MG tablet; Take 1 tablet by mouth As Needed for Erectile Dysfunction.  Dispense: 20 tablet; Refill: 3    4. Advised about management of weight       Assessment & Plan  Polycythemia   - Persistently elevated hematocrit levels are concerning despite regular blood donations  - Current testosterone dosage may be too high, increasing risk of heart attack or stroke  Hypogonadism  - Continue Xyosted 75 mg  - Follow up in 4 weeks for testosterone level reassessment  - If hematocrit remains high and testosterone is normal, consider pulmonologist referral for CPAP settings or hematologist referral for primary polycythemia or secondary causes  Erectile dysfunction  - Metabolic syndrome, including diabetes, may contribute to erectile dysfunction  - Prescribe Viagra with instructions to take one tablet daily PRN, increasing to two tablets if ineffective  Weight management  - Advise small, frequent meals and regular physical activity  - Recommend shopping the exterior portion of the grocery store for healthier food choices     Return in 1 month (on 5/9/2025) for f/u with Vicki to review lab work. .    Vicki Roy, MSN, APRN, FNP-C  [facility]

## 2025-05-02 ENCOUNTER — LAB (OUTPATIENT)
Dept: LAB | Facility: HOSPITAL | Age: 60
End: 2025-05-02
Payer: COMMERCIAL

## 2025-05-02 DIAGNOSIS — D75.1 POLYCYTHEMIA: ICD-10-CM

## 2025-05-02 LAB
HCT VFR BLD AUTO: 53.8 % (ref 37.5–51)
HGB BLD-MCNC: 17.8 G/DL (ref 13–17.7)

## 2025-05-02 PROCEDURE — 85018 HEMOGLOBIN: CPT

## 2025-05-02 PROCEDURE — 85014 HEMATOCRIT: CPT

## 2025-05-02 PROCEDURE — 85014 HEMATOCRIT: CPT | Performed by: NURSE PRACTITIONER

## 2025-05-02 PROCEDURE — 85018 HEMOGLOBIN: CPT | Performed by: NURSE PRACTITIONER

## 2025-05-02 PROCEDURE — 84403 ASSAY OF TOTAL TESTOSTERONE: CPT | Performed by: NURSE PRACTITIONER

## 2025-05-05 ENCOUNTER — RESULTS FOLLOW-UP (OUTPATIENT)
Dept: UROLOGY | Facility: CLINIC | Age: 60
End: 2025-05-05
Payer: COMMERCIAL

## 2025-05-07 ENCOUNTER — OFFICE VISIT (OUTPATIENT)
Dept: UROLOGY | Facility: CLINIC | Age: 60
End: 2025-05-07
Payer: COMMERCIAL

## 2025-05-07 ENCOUNTER — APPOINTMENT (OUTPATIENT)
Dept: CT IMAGING | Facility: HOSPITAL | Age: 60
End: 2025-05-07
Payer: COMMERCIAL

## 2025-05-07 ENCOUNTER — HOSPITAL ENCOUNTER (EMERGENCY)
Facility: HOSPITAL | Age: 60
Discharge: HOME | End: 2025-05-07
Attending: EMERGENCY MEDICINE
Payer: COMMERCIAL

## 2025-05-07 VITALS
HEART RATE: 88 BPM | SYSTOLIC BLOOD PRESSURE: 124 MMHG | RESPIRATION RATE: 18 BRPM | HEIGHT: 65 IN | DIASTOLIC BLOOD PRESSURE: 65 MMHG | OXYGEN SATURATION: 97 % | TEMPERATURE: 97.9 F | WEIGHT: 196.21 LBS | BODY MASS INDEX: 32.69 KG/M2

## 2025-05-07 VITALS
HEART RATE: 89 BPM | TEMPERATURE: 97.1 F | WEIGHT: 197 LBS | RESPIRATION RATE: 17 BRPM | BODY MASS INDEX: 32.66 KG/M2 | OXYGEN SATURATION: 96 %

## 2025-05-07 DIAGNOSIS — R10.9 FLANK PAIN: Primary | ICD-10-CM

## 2025-05-07 DIAGNOSIS — E29.1 HYPOGONADISM IN MALE: ICD-10-CM

## 2025-05-07 DIAGNOSIS — D75.1 POLYCYTHEMIA: ICD-10-CM

## 2025-05-07 DIAGNOSIS — M54.50 ACUTE RIGHT-SIDED LOW BACK PAIN WITHOUT SCIATICA: Primary | ICD-10-CM

## 2025-05-07 DIAGNOSIS — K59.00 CONSTIPATION, UNSPECIFIED CONSTIPATION TYPE: ICD-10-CM

## 2025-05-07 LAB
ALBUMIN SERPL-MCNC: 4.5 G/DL (ref 3.5–5.2)
ALBUMIN/GLOB SERPL: 1.2 G/DL
ALP SERPL-CCNC: 64 U/L (ref 39–117)
ALT SERPL W P-5'-P-CCNC: 16 U/L (ref 1–41)
ANION GAP SERPL CALCULATED.3IONS-SCNC: 12.5 MMOL/L (ref 5–15)
AST SERPL-CCNC: 30 U/L (ref 1–40)
BASOPHILS # BLD AUTO: 0.1 10*3/MM3 (ref 0–0.2)
BASOPHILS NFR BLD AUTO: 0.7 % (ref 0–1.5)
BILIRUB BLD-MCNC: NEGATIVE MG/DL
BILIRUB SERPL-MCNC: 0.4 MG/DL (ref 0–1.2)
BILIRUB UR QL STRIP: NEGATIVE
BUN SERPL-MCNC: 17 MG/DL (ref 8–23)
BUN/CREAT SERPL: 17.9 (ref 7–25)
CALCIUM SPEC-SCNC: 9.4 MG/DL (ref 8.6–10.5)
CHLORIDE SERPL-SCNC: 100 MMOL/L (ref 98–107)
CLARITY UR: CLEAR
CLARITY, POC: CLEAR
CO2 SERPL-SCNC: 24.5 MMOL/L (ref 22–29)
COLOR UR: YELLOW
COLOR UR: YELLOW
CREAT SERPL-MCNC: 0.95 MG/DL (ref 0.76–1.27)
D-LACTATE SERPL-SCNC: 1.7 MMOL/L (ref 0.5–2)
DEPRECATED RDW RBC AUTO: 40.1 FL (ref 37–54)
EGFRCR SERPLBLD CKD-EPI 2021: 91.6 ML/MIN/1.73
EOSINOPHIL # BLD AUTO: 0.31 10*3/MM3 (ref 0–0.4)
EOSINOPHIL NFR BLD AUTO: 2.2 % (ref 0.3–6.2)
ERYTHROCYTE [DISTWIDTH] IN BLOOD BY AUTOMATED COUNT: 12.6 % (ref 12.3–15.4)
EXPIRATION DATE: ABNORMAL
GLOBULIN UR ELPH-MCNC: 3.7 GM/DL
GLUCOSE SERPL-MCNC: 193 MG/DL (ref 65–99)
GLUCOSE UR STRIP-MCNC: ABNORMAL MG/DL
GLUCOSE UR STRIP-MCNC: ABNORMAL MG/DL
HCT VFR BLD AUTO: 55.7 % (ref 37.5–51)
HGB BLD-MCNC: 18.6 G/DL (ref 13–17.7)
HGB UR QL STRIP.AUTO: NEGATIVE
HOLD SPECIMEN: NORMAL
HOLD SPECIMEN: NORMAL
IMM GRANULOCYTES # BLD AUTO: 0.05 10*3/MM3 (ref 0–0.05)
IMM GRANULOCYTES NFR BLD AUTO: 0.4 % (ref 0–0.5)
KETONES UR QL STRIP: ABNORMAL
KETONES UR QL: NEGATIVE
LEUKOCYTE EST, POC: NEGATIVE
LEUKOCYTE ESTERASE UR QL STRIP.AUTO: NEGATIVE
LIPASE SERPL-CCNC: 62 U/L (ref 13–60)
LYMPHOCYTES # BLD AUTO: 2.37 10*3/MM3 (ref 0.7–3.1)
LYMPHOCYTES NFR BLD AUTO: 17.1 % (ref 19.6–45.3)
Lab: ABNORMAL
MCH RBC QN AUTO: 29.1 PG (ref 26.6–33)
MCHC RBC AUTO-ENTMCNC: 33.4 G/DL (ref 31.5–35.7)
MCV RBC AUTO: 87 FL (ref 79–97)
MONOCYTES # BLD AUTO: 1.16 10*3/MM3 (ref 0.1–0.9)
MONOCYTES NFR BLD AUTO: 8.4 % (ref 5–12)
NEUTROPHILS NFR BLD AUTO: 71.2 % (ref 42.7–76)
NEUTROPHILS NFR BLD AUTO: 9.84 10*3/MM3 (ref 1.7–7)
NITRITE UR QL STRIP: NEGATIVE
NITRITE UR-MCNC: NEGATIVE MG/ML
NRBC BLD AUTO-RTO: 0 /100 WBC (ref 0–0.2)
PH UR STRIP.AUTO: <=5 [PH] (ref 5–8)
PH UR: 5.5 [PH] (ref 5–8)
PLATELET # BLD AUTO: 255 10*3/MM3 (ref 140–450)
PMV BLD AUTO: 9.7 FL (ref 6–12)
POTASSIUM SERPL-SCNC: 4.3 MMOL/L (ref 3.5–5.2)
POTASSIUM SERPL-SCNC: 5.4 MMOL/L (ref 3.5–5.2)
PROT SERPL-MCNC: 8.2 G/DL (ref 6–8.5)
PROT UR QL STRIP: ABNORMAL
PROT UR STRIP-MCNC: NEGATIVE MG/DL
RBC # BLD AUTO: 6.4 10*6/MM3 (ref 4.14–5.8)
RBC # UR STRIP: NEGATIVE /UL
SODIUM SERPL-SCNC: 137 MMOL/L (ref 136–145)
SP GR UR STRIP: >=1.03 (ref 1–1.03)
SP GR UR: 1.01 (ref 1–1.03)
UROBILINOGEN UR QL STRIP: ABNORMAL
UROBILINOGEN UR QL: ABNORMAL
WBC NRBC COR # BLD AUTO: 13.83 10*3/MM3 (ref 3.4–10.8)
WHOLE BLOOD HOLD COAG: NORMAL
WHOLE BLOOD HOLD SPECIMEN: NORMAL

## 2025-05-07 PROCEDURE — 99285 EMERGENCY DEPT VISIT HI MDM: CPT | Performed by: EMERGENCY MEDICINE

## 2025-05-07 PROCEDURE — 85025 COMPLETE CBC W/AUTO DIFF WBC: CPT | Performed by: EMERGENCY MEDICINE

## 2025-05-07 PROCEDURE — 80053 COMPREHEN METABOLIC PANEL: CPT | Performed by: EMERGENCY MEDICINE

## 2025-05-07 PROCEDURE — 84132 ASSAY OF SERUM POTASSIUM: CPT | Performed by: EMERGENCY MEDICINE

## 2025-05-07 PROCEDURE — 94799 UNLISTED PULMONARY SVC/PX: CPT

## 2025-05-07 PROCEDURE — 83605 ASSAY OF LACTIC ACID: CPT | Performed by: EMERGENCY MEDICINE

## 2025-05-07 PROCEDURE — 94761 N-INVAS EAR/PLS OXIMETRY MLT: CPT

## 2025-05-07 PROCEDURE — 81003 URINALYSIS AUTO W/O SCOPE: CPT | Performed by: EMERGENCY MEDICINE

## 2025-05-07 PROCEDURE — 25810000003 SODIUM CHLORIDE 0.9 % SOLUTION: Performed by: EMERGENCY MEDICINE

## 2025-05-07 PROCEDURE — 94664 DEMO&/EVAL PT USE INHALER: CPT

## 2025-05-07 PROCEDURE — 94640 AIRWAY INHALATION TREATMENT: CPT

## 2025-05-07 PROCEDURE — 25510000001 IOPAMIDOL 61 % SOLUTION: Performed by: EMERGENCY MEDICINE

## 2025-05-07 PROCEDURE — 83690 ASSAY OF LIPASE: CPT | Performed by: EMERGENCY MEDICINE

## 2025-05-07 PROCEDURE — 74177 CT ABD & PELVIS W/CONTRAST: CPT

## 2025-05-07 RX ORDER — ALBUTEROL SULFATE 0.83 MG/ML
2.5 SOLUTION RESPIRATORY (INHALATION) ONCE
Status: COMPLETED | OUTPATIENT
Start: 2025-05-07 | End: 2025-05-07

## 2025-05-07 RX ORDER — HYDROXYZINE HYDROCHLORIDE 25 MG/1
25 TABLET, FILM COATED ORAL ONCE
Status: COMPLETED | OUTPATIENT
Start: 2025-05-07 | End: 2025-05-07

## 2025-05-07 RX ORDER — CYCLOBENZAPRINE HCL 10 MG
10 TABLET ORAL EVERY 8 HOURS PRN
Qty: 30 TABLET | Refills: 0 | Status: SHIPPED | OUTPATIENT
Start: 2025-05-07

## 2025-05-07 RX ORDER — POLYETHYLENE GLYCOL 3350 17 G/17G
17 POWDER, FOR SOLUTION ORAL ONCE
Status: COMPLETED | OUTPATIENT
Start: 2025-05-07 | End: 2025-05-07

## 2025-05-07 RX ORDER — TESTOSTERONE ENANTHATE 50 MG/.5ML
1 INJECTION SUBCUTANEOUS WEEKLY
Qty: 2 ML | Refills: 0 | Status: SHIPPED | OUTPATIENT
Start: 2025-05-07

## 2025-05-07 RX ORDER — IOPAMIDOL 612 MG/ML
100 INJECTION, SOLUTION INTRAVASCULAR
Status: COMPLETED | OUTPATIENT
Start: 2025-05-07 | End: 2025-05-07

## 2025-05-07 RX ORDER — POLYETHYLENE GLYCOL 3350 17 G/17G
17 POWDER, FOR SOLUTION ORAL DAILY
Qty: 30 PACKET | Refills: 11 | Status: SHIPPED | OUTPATIENT
Start: 2025-05-07

## 2025-05-07 RX ORDER — SODIUM CHLORIDE 0.9 % (FLUSH) 0.9 %
10 SYRINGE (ML) INJECTION AS NEEDED
Status: DISCONTINUED | OUTPATIENT
Start: 2025-05-07 | End: 2025-05-07 | Stop reason: HOSPADM

## 2025-05-07 RX ADMIN — IOPAMIDOL 100 ML: 612 INJECTION, SOLUTION INTRAVENOUS at 14:01

## 2025-05-07 RX ADMIN — POLYETHYLENE GLYCOL 3350 17 G: 17 POWDER, FOR SOLUTION ORAL at 10:25

## 2025-05-07 RX ADMIN — HYDROXYZINE HYDROCHLORIDE 25 MG: 25 TABLET, FILM COATED ORAL at 14:18

## 2025-05-07 RX ADMIN — ALBUTEROL SULFATE 2.5 MG: 2.5 SOLUTION RESPIRATORY (INHALATION) at 14:15

## 2025-05-07 RX ADMIN — SODIUM CHLORIDE 1000 ML: 9 INJECTION, SOLUTION INTRAVENOUS at 14:06

## 2025-05-07 NOTE — ED PROVIDER NOTES
"     [facility] EMERGENCY DEPARTMENT  Emergency Department Encounter  Emergency Medicine Physician Note     Pt Name:Neftali Irvin  MRN: [id number]  Birthdate 1965  Date of evaluation: 5/7/2025  PCP:  Mary Machado APRN  Note Started: 9:47 AM EDT      CHIEF COMPLAINT       Chief Complaint   Patient presents with    Flank Pain    Constipation     Pt CO R. Flank pain for approximately 1 week. Pt also reports he's been dealing with constipation for a month. Pt reports he has had several small BM but that its been about a month since he's had a good regular one.        HISTORY OF PRESENT ILLNESS  (Location/Symptom, Timing/Onset, Context/Setting, Quality, Duration, Modifying Factors, Severity.)      Neftali Irvin is a 60 y.o. male who presents with right-sided flank pain and constipation.  Patient states that he typically gets this dull flank pain when he had constipation, states that he has had no good bowel movement for a multiple weeks, does describe some small bowel movements.  Patient describes it as left flank pain, nonradiating.  Patient denies any painful urination, blood in his urine.  Patient also denies any diarrhea.    PAST MEDICAL / SURGICAL / SOCIAL / FAMILY HISTORY     Past Medical History:   Diagnosis Date    Acid reflux     ADHD     Arthritis     Asthma     Blood vessel problems in eyes     states eye doctor told him he had \"some bleeding\" behind his eyes due to DM    Cataract, bilateral     no surgery yet    Colon polyps     Depression     Diabetes mellitus     Elevated cholesterol     Erectile dysfunction     Gout     High cholesterol     Histoplasmosis     eye (doesn't remember laterality)    Hx of exercise stress test     Hypertension     PONV (postoperative nausea and vomiting)     Sleep apnea     non-compliant with CPAP    Teeth missing     Wears glasses      No additional pertinent       Past Surgical History:   Procedure Laterality Date    COLONOSCOPY N/A 03/11/2021 "    Procedure: COLONOSCOPY WITH HOT SNARE POLYPECTOMY AND TATTOO INJECTION, CLIP PLACEMENT X1;  Surgeon: Michael Marroquin MD;  Location: [facility] ENDOSCOPY;  Service: Gastroenterology;  Laterality: N/A;    FOOT SURGERY Right     pellet in foot    HERNIA REPAIR Left 06/2021    groin     No additional pertinent       Social History     Socioeconomic History    Marital status:    Tobacco Use    Smoking status: Never    Smokeless tobacco: Never   Vaping Use    Vaping status: Never Used   Substance and Sexual Activity    Alcohol use: Never    Drug use: Never    Sexual activity: Yes     Partners: Female       Family History   Problem Relation Age of Onset    Heart disease Father     Cancer Mother 44        kidney    Cancer Maternal Aunt     Prostate cancer Neg Hx        Allergies:  Atorvastatin, Grapeseed extract [nutritional supplements], Metformin, Milk-related compounds, Other, Red dye #40 (allura red), and Trona    Home Medications:  Prior to Admission medications    Medication Sig Start Date End Date Taking? Authorizing Provider   albuterol sulfate  (90 Base) MCG/ACT inhaler Inhale 2 puffs Every 4 (Four) Hours As Needed for Wheezing. 8/24/23   Linn Mcqueen APRN   Aspirin Low Dose 81 MG EC tablet  5/14/21   Michael Britt MD   B-D UF III MINI PEN NEEDLES 31G X 5 MM misc  12/4/20   Michael Britt MD   chlorthalidone (HYGROTON) 25 MG tablet  5/14/21   Michael Britt MD   cholecalciferol 25 MCG tablet Take 1 tablet by mouth Daily. 3/11/25   Michael Britt MD   Continuous Glucose Sensor (Dexcom G6 Sensor) USE FOR CONTINUOUS GLUCOSE MONITORING CHANGING SENSOR EVERY 10 DAYS, PATIENT NEEDS APPT 8/2/24   Michael Britt MD   Continuous Glucose Transmitter (Dexcom G6 Transmitter) misc USE FOR CONTINUOUS GLUCOSE MONITORING, CHANGING TRANSMITTER EVERY 90 DAYS, PATIENT NEEDS APPT FOR REFILLS 7/22/24   Michael Britt MD   cyclobenzaprine (FLEXERIL) 10 MG  tablet Take 1 tablet by mouth Every 8 (Eight) Hours As Needed for Muscle Spasms. 5/7/25   Vicki Roy APRN   Deep Sea Nasal Spray 0.65 % nasal spray Administer 1 spray into the nostril(s) as directed by provider As Needed for Congestion. 12/17/24   Michael Britt MD   diclofenac (VOLTAREN) 50 MG EC tablet Take 1 tablet by mouth 2 (Two) Times a Day for 3 days. 5/7/25 5/10/25  Vicki Roy APRN   ezetimibe (ZETIA) 10 MG tablet Take 1 tablet by mouth Daily. 9/4/24   Michael Britt MD   Farxiga 10 MG tablet TAKE ONE (1) TABLET BY MOUTH EACH MORNING 11/11/24   Michael Britt MD   Insulin Glargine (BASAGLAR KWIKPEN) 100 UNIT/ML injection pen Inject 50 Units under the skin into the appropriate area as directed Daily.    Michael Britt MD   insulin glargine (LANTUS, SEMGLEE) 100 UNIT/ML injection Inject 40 Units under the skin into the appropriate area as directed Daily.    Michael Britt MD   Lancets (OneTouch Delica Plus [id number]) misc  12/4/20   Michael Britt MD   Loratadine 10 MG capsule Take  by mouth.    Michael Britt MD   losartan (COZAAR) 100 MG tablet Take 1 tablet by mouth Daily.    Michael Britt MD   meloxicam (MOBIC) 7.5 MG tablet Take 1 tablet by mouth Daily. 11/1/18   Aparna Irvin APRN   methocarbamol (ROBAXIN) 750 MG tablet Take 1 tablet by mouth 3 (Three) Times a Day As Needed for Muscle Spasms.  Patient not taking: Reported on 4/9/2025 11/1/18   Irvin Aparna LJACKIE   Mounjaro 2.5 MG/0.5ML solution auto-injector INJECT 2.5MG UNDER THE SKIN EVERY WEEK FOR FOUR WEEKS  Patient not taking: Reported on 5/7/2025 11/27/24   Provider, Historical, MD   OneTouch Verio test strip  12/4/20   Michael Britt MD   polyethylene glycol (MIRALAX) 17 g packet Take 17 g by mouth Daily. 5/7/25   Vicki Roy APRN   Semaglutide, 1 MG/DOSE, (Ozempic, 1 MG/DOSE,) 2 MG/1.5ML solution pen-injector Inject  under the skin into the appropriate  "area as directed 1 (One) Time Per Week.    Provider, MD Michael   sildenafil (VIAGRA) 25 MG tablet Take 1 tablet by mouth As Needed for Erectile Dysfunction. 4/9/25   Vicki Roy APRN   Testosterone Enanthate (Xyosted) 50 MG/0.5ML solution auto-injector Inject 1 each under the skin into the appropriate area as directed 1 (One) Time Per Week. 5/7/25   Vicki Roy APRN   Testosterone Enanthate (Xyosted) 75 MG/0.5ML solution auto-injector Inject 75 mg under the skin into the appropriate area as directed Every 7 (Seven) Days. 4/9/25 5/7/25  Vicki Roy APRN         REVIEW OF SYSTEMS       Review of Systems   Constitutional:  Negative for chills and fever.   Respiratory:  Negative for chest tightness and shortness of breath.    Cardiovascular:  Negative for chest pain.   Gastrointestinal:  Positive for abdominal pain and constipation. Negative for diarrhea, nausea and vomiting.   Genitourinary:  Positive for flank pain.   Neurological:  Negative for dizziness and light-headedness.       PHYSICAL EXAM      INITIAL VITALS:   /65   Pulse 88   Temp 97.9 °F (36.6 °C) (Oral)   Resp 18   Ht 165 cm (64.96\")   Wt 89 kg (196 lb 3.4 oz)   SpO2 97%   BMI 32.69 kg/m²     Physical Exam  Constitutional:       Appearance: Normal appearance.   HENT:      Head: Normocephalic and atraumatic.   Eyes:      Extraocular Movements: Extraocular movements intact.   Cardiovascular:      Rate and Rhythm: Normal rate and regular rhythm.   Pulmonary:      Effort: Pulmonary effort is normal.      Breath sounds: Normal breath sounds.   Abdominal:      General: Abdomen is flat.      Palpations: Abdomen is soft.      Tenderness: There is no abdominal tenderness.   Musculoskeletal:      Right lower leg: No edema.      Left lower leg: No edema.   Skin:     General: Skin is warm and dry.   Neurological:      General: No focal deficit present.      Mental Status: He is alert and oriented to person, place, and time. "   Psychiatric:         Mood and Affect: Mood normal.         Behavior: Behavior normal.           DDX/DIAGNOSTIC RESULTS / EMERGENCY DEPARTMENT COURSE / MDM     Differential Diagnosis included but not limited: Nephrolithiasis, acute Cystitis, constipation, diverticulitis, colitis    Diagnoses Considered but Do Not Suspect: Peritoneal or acute surgical abdomen    Decision Rules/Scores utilized: N/A     Tests considered but not ordered and why:  N/A     MIPS: N/A     Code Status Discussion:  Not Discussed    Additional Patient Education Provided: None     Medical Decision Making    Medical Decision Making  Patient specifically presenting with left-sided lower side pain, this is noted to be over the iliac crest.  Iliac crest on the left side.  Patient with no testicular pain, burning with urination or urinary complaints.  Patient does describe some constipation and states that he gets this symptoms when he is had constipation in the past.  Initially attempted conservative management with enema and constipation management.  Patient having a bowel movement in the emergency department with minimal improvement of his flank pain.  Decision was made to get CT imaging and laboratory workup.  There were signs of dehydration with hemoconcentration and elevated potassium on initial workup.  Patient was given IV fluids.  CT imaging was nonspecific for any critical findings.  Patient did have mild leukocytosis, do feel this is more secondary to hemoconcentration due to other labs being concentrated than infectious process.  Patient afebrile and stable vitals.  With grossly negative imaging, laboratory workup grossly stable.  Repeat a potassium which did improve with IV fluids.  Do feel patient is stable for discharge at this time with no specific cause of patient's left-sided flank pain.  Urine demonstrated no concerning concerns for infectious process or concerns for nephrolithiasis.  Patient was instructed to follow-up with his  PCP.  Patient was agreeable plan discharged home in stable condition.  Patient given return precautions    Problems Addressed:  Flank pain: complicated acute illness or injury    Amount and/or Complexity of Data Reviewed  External Data Reviewed: notes.  Labs: ordered. Decision-making details documented in ED Course.  Radiology: ordered.    Risk  OTC drugs.  Prescription drug management.        See ED COURSE for additional MDM statements    EKG  None Performed     All EKG's are interpreted by the Emergency Department Physician who either signs or Co-signs this chart in the absence of a cardiologist.    Additional Scores                   EMERGENCY DEPARTMENT COURSE:    ED Course as of 05/08/25 0658   Wed May 07, 2025   1155 Glucose(!): >=1000 mg/dL (3+) [CR]   1233 WBC(!): 13.83 [CR]   1342 Potassium(!): 5.4 [CR]   1343 Lipase(!): 62 [CR]   1413 Patient noted to be having upper respiratory transmitted lung sounds, patient states that he gets like this when he is anxious.  Patient with no active wheezing, patient does feel that this is his asthma is acting up.  Will give him albuterol inhaler.  Patient pending CT evaluation at this time.  No swelling, no difficulty swallowing and no difficulty talking, no concern for anaphylactic reaction to contrast dye at this time. [CR]   1443 Hemoglobin(!): 18.6  Patient with hemoconcentration, ketones in urine, concern for dehydration.  Patient with no anion gap normal bicarb no concern for DKA.   [CR]      ED Course User Index  [CR] Juan Boogie, DO       PROCEDURES:  None Performed   Procedures    DATA FOR LAB AND RADIOLOGY TESTS ORDERED BELOW ARE REVIEWED BY THE ED CLINICIAN:    RADIOLOGY: All x-rays, CT, MRI, and formal ultrasound images (except ED bedside ultrasound) are read by the radiologist, see reports below, unless otherwise noted in MDM or here.  Reports below are reviewed by myself.  CT Abdomen Pelvis With Contrast   Final Result   No evidence of acute  intra-abdominal process.       No nephrolithiasis or hydronephrosis identified.           CTDI: 9.07 mGy   DLP: 470.01 mGy.cm           This study was performed with techniques to keep radiation doses as low   as reasonably achievable (ALARA). Individualized dose reduction   techniques using automated exposure control or adjustment of mA and/or   kV according to the patient size were employed.                       Images were reviewed, interpreted, and dictated by Dr. Teresa Panchal MD   Transcribed by Olivia Padgett PA-C.       This report was signed and finalized on 5/7/2025 3:21 PM by Teresa Panchal MD.              LABS: Lab orders shown below, the results are reviewed by myself, and all abnormals are listed below.  Labs Reviewed   URINALYSIS W/ CULTURE IF INDICATED - Abnormal; Notable for the following components:       Result Value    Glucose, UA >=1000 mg/dL (3+) (*)     Ketones, UA Trace (*)     Protein, UA Trace (*)     All other components within normal limits    Narrative:     In absence of clinical symptoms, the presence of pyuria, bacteria, and/or nitrites on the urinalysis result does not correlate with infection.  Urine microscopic not indicated.   COMPREHENSIVE METABOLIC PANEL - Abnormal; Notable for the following components:    Glucose 193 (*)     Potassium 5.4 (*)     All other components within normal limits    Narrative:     GFR Categories in Chronic Kidney Disease (CKD)              GFR Category          GFR (mL/min/1.73)    Interpretation  G1                    90 or greater        Normal or high (1)  G2                    60-89                Mild decrease (1)  G3a                   45-59                Mild to moderate decrease  G3b                   30-44                Moderate to severe decrease  G4                    15-29                Severe decrease  G5                    14 or less           Kidney failure    (1)In the absence of evidence of kidney disease, neither GFR category G1  or G2 fulfill the criteria for CKD.    eGFR calculation 2021 CKD-EPI creatinine equation, which does not include race as a factor   LIPASE - Abnormal; Notable for the following components:    Lipase 62 (*)     All other components within normal limits   CBC WITH AUTO DIFFERENTIAL - Abnormal; Notable for the following components:    WBC 13.83 (*)     RBC 6.40 (*)     Hemoglobin 18.6 (*)     Hematocrit 55.7 (*)     Lymphocyte % 17.1 (*)     Neutrophils, Absolute 9.84 (*)     Monocytes, Absolute 1.16 (*)     All other components within normal limits   LACTIC ACID, PLASMA - Normal   POTASSIUM - Normal   RAINBOW DRAW    Narrative:     The following orders were created for panel order [place] Draw.  Procedure                               Abnormality         Status                     ---------                               -----------         ------                     Green Top (Gel)[[id number]]                                  Final result               Lavender Top[[id number]]                                     Final result               Gold Top - SST[[id number]]                                   Final result               Light Blue Top[[id number]]                                   Final result                 Please view results for these tests on the individual orders.   POCT GLUCOSE FINGERSTICK   CBC AND DIFFERENTIAL    Narrative:     The following orders were created for panel order CBC & Differential.  Procedure                               Abnormality         Status                     ---------                               -----------         ------                     CBC Auto Differential[[id number]]        Abnormal            Final result                 Please view results for these tests on the individual orders.   GREEN TOP   LAVENDER TOP   GOLD TOP - SST   LIGHT BLUE TOP       Vitals Reviewed:    Vitals:    05/07/25 1559 05/07/25 1600 05/07/25 1601 05/07/25 1619   BP:  124/65     BP Location:        Patient Position:       Pulse:    88   Resp:    18   Temp:    97.9 °F (36.6 °C)   TempSrc:    Oral   SpO2: 97%  97%    Weight:       Height:           MEDICATIONS GIVEN TO PATIENT THIS ENCOUNTER:  Medications   polyethylene glycol (MIRALAX) packet 17 g (17 g Oral Given 5/7/25 1025)   sodium chloride 0.9 % bolus 1,000 mL (0 mL Intravenous Stopped 5/7/25 1536)   iopamidol (ISOVUE-300) 61 % injection 100 mL (100 mL Intravenous Given 5/7/25 1401)   hydrOXYzine (ATARAX) tablet 25 mg (25 mg Oral Given 5/7/25 1418)   albuterol (PROVENTIL) nebulizer solution 0.083% 2.5 mg/3mL (2.5 mg Nebulization Given 5/7/25 1415)       CONSULTS:  None    CRITICAL CARE:  There was significant risk of life threatening deterioration of patient's condition requiring my direct management. Critical care time 0 minutes, excluding any documented procedures.    FINAL IMPRESSION      1. Flank pain          DISPOSITION / PLAN     ED Disposition       ED Disposition   Discharge    Condition   Stable    Comment   --               PATIENT REFERRED TO:  Mary Machado APRN  [facility] [address] [place] [address] [phone]    Schedule an appointment as soon as possible for a visit in 1 week        DISCHARGE MEDICATIONS:     Medication List        CONTINUE taking these medications      albuterol sulfate  (90 Base) MCG/ACT inhaler  Commonly known as: PROVENTIL HFA;VENTOLIN HFA;PROAIR HFA  Inhale 2 puffs Every 4 (Four) Hours As Needed for Wheezing.     Aspirin Low Dose 81 MG EC tablet  Generic drug: aspirin     B-D UF III MINI PEN NEEDLES 31G X 5 MM misc  Generic drug: Insulin Pen Needle     * BASAGLAR KWIKPEN 100 UNIT/ML injection pen     * insulin glargine 100 UNIT/ML injection  Commonly known as: LANTUS, SEMGLEE     chlorthalidone 25 MG tablet  Commonly known as: HYGROTON     cholecalciferol 25 MCG (1000 UT) tablet  Commonly known as: VITAMIN D3     cyclobenzaprine 10 MG tablet  Commonly known as: FLEXERIL  Take 1 tablet by mouth Every 8 (Eight)  Hours As Needed for Muscle Spasms.     Deep Sea Nasal Spray 0.65 % nasal spray  Generic drug: sodium chloride     Dexcom G6 Sensor     Dexcom G6 Transmitter misc     diclofenac 50 MG EC tablet  Commonly known as: VOLTAREN  Take 1 tablet by mouth 2 (Two) Times a Day for 3 days.     ezetimibe 10 MG tablet  Commonly known as: ZETIA     Farxiga 10 MG tablet  Generic drug: dapagliflozin Propanediol     Loratadine 10 MG capsule     losartan 100 MG tablet  Commonly known as: COZAAR     meloxicam 7.5 MG tablet  Commonly known as: MOBIC  Take 1 tablet by mouth Daily.     methocarbamol 750 MG tablet  Commonly known as: ROBAXIN  Take 1 tablet by mouth 3 (Three) Times a Day As Needed for Muscle Spasms.     Mounjaro 2.5 MG/0.5ML solution auto-injector  Generic drug: Tirzepatide     OneTouch Delica Plus [id number] misc     OneTouch Verio test strip  Generic drug: glucose blood     Ozempic (1 MG/DOSE) 2 MG/1.5ML solution pen-injector  Generic drug: Semaglutide (1 MG/DOSE)     polyethylene glycol 17 g packet  Commonly known as: MIRALAX  Take 17 g by mouth Daily.     sildenafil 25 MG tablet  Commonly known as: VIAGRA  Take 1 tablet by mouth As Needed for Erectile Dysfunction.     Xyosted 50 MG/0.5ML solution auto-injector  Generic drug: Testosterone Enanthate  Inject 1 each under the skin into the appropriate area as directed 1 (One) Time Per Week.           * This list has 2 medication(s) that are the same as other medications prescribed for you. Read the directions carefully, and ask your doctor or other care provider to review them with you.                  Electronically signed by Juan Boogie DO, 05/07/25, 9:47 AM EDT.    Emergency Medicine Physician  [facility]  (Please note that portions of thisnote were completed with a voice recognition program.  Efforts were made to edit the dictations but occasionally words are mis-transcribed.)       Juan Boogie DO  05/08/25 0700        Juan Boogie, DO  05/08/25 0701

## 2025-05-07 NOTE — PATIENT INSTRUCTIONS
Please hold/do not take Aspirin while on Diclofenac  Please hold/do not take Meloxicam while on Diclofenac   Please hold/do not take Methocarbamol while taking Cyclobenzaprine

## 2025-05-07 NOTE — PROGRESS NOTES
"       Office Visit     Patient Name: Neftali Irvin  : 1965   MRN: [id number]   Patient or patient representative verbalized consent for the use of Ambient Listening during the visit with  JACKIE Ledesma for chart documentation. 2025  09:15 EDT    Chief Complaint   Patient presents with    Hypogonadism     Referring Provider: No ref. provider found    Primary Care Provider: Mary Machado APRN     History of Present Illness  Mr. Irvin is a 60 year old male who presents to discuss his testosterone replacement and polycythemia.  He also has complaints of right lower back pain into the buttocks that he would like to discuss.      Back Pain  - Persistent for several weeks  - Exacerbated by movement  - Possibly due to muscle strain after replacing an alternator in his vehicle.    - Describes spasms in the affected area  - Recalls similar symptoms from a back injury previously resolved with treatment but did not follow up as advised  - No urinary symptoms or pain during urination  - Wakes up at 2:30 or 3:00 AM due to sharp pain last night.  Having difficulty changing positions.    - Describes his current state as miserable  - Not taking meloxicam    Elevated Hematocrit  - Concerned about a stroke  - On medication for blood pressure, cholesterol    TRT  - Total testosterone is within normal limits.      Constipation  - Irregular bowel movements  - Believes regular bowel movements could alleviate his back pain  - Reports daily bowel movements that are small and hard, resembling rabbit pellets  - Mobility improved slightly after a bowel movement but is constipated again       Subjective   Review of System: As noted in HPI.    Past Medical History:   Diagnosis Date    Acid reflux     ADHD     Arthritis     Asthma     Blood vessel problems in eyes     states eye doctor told him he had \"some bleeding\" behind his eyes due to DM    Cataract, bilateral     no surgery yet    Colon polyps     Depression     " Diabetes mellitus     Elevated cholesterol     Erectile dysfunction     Gout     High cholesterol     Histoplasmosis     eye (doesn't remember laterality)    Hx of exercise stress test     Hypertension     PONV (postoperative nausea and vomiting)     Sleep apnea     non-compliant with CPAP    Teeth missing     Wears glasses      Past Surgical History:   Procedure Laterality Date    COLONOSCOPY N/A 03/11/2021    Procedure: COLONOSCOPY WITH HOT SNARE POLYPECTOMY AND TATTOO INJECTION, CLIP PLACEMENT X1;  Surgeon: Michael Marroquin MD;  Location: [facility] ENDOSCOPY;  Service: Gastroenterology;  Laterality: N/A;    FOOT SURGERY Right     pellet in foot    HERNIA REPAIR Left 06/2021    groin     Family History   Problem Relation Age of Onset    Heart disease Father     Cancer Mother 44        kidney    Cancer Maternal Aunt     Prostate cancer Neg Hx      Social History     Socioeconomic History    Marital status:    Tobacco Use    Smoking status: Never    Smokeless tobacco: Never   Vaping Use    Vaping status: Never Used   Substance and Sexual Activity    Alcohol use: Never    Drug use: Never    Sexual activity: Yes     Partners: Female     No current facility-administered medications for this visit.    Current Outpatient Medications:     albuterol sulfate  (90 Base) MCG/ACT inhaler, Inhale 2 puffs Every 4 (Four) Hours As Needed for Wheezing., Disp: 18 g, Rfl: 3    Aspirin Low Dose 81 MG EC tablet, , Disp: , Rfl:     B-D UF III MINI PEN NEEDLES 31G X 5 MM misc, , Disp: , Rfl:     chlorthalidone (HYGROTON) 25 MG tablet, , Disp: , Rfl:     cholecalciferol 25 MCG tablet, Take 1 tablet by mouth Daily., Disp: , Rfl:     Continuous Glucose Sensor (Dexcom G6 Sensor), USE FOR CONTINUOUS GLUCOSE MONITORING CHANGING SENSOR EVERY 10 DAYS, PATIENT NEEDS APPT, Disp: , Rfl:     Continuous Glucose Transmitter (Dexcom G6 Transmitter) misc, USE FOR CONTINUOUS GLUCOSE MONITORING, CHANGING TRANSMITTER EVERY 90 DAYS, PATIENT  NEEDS APPT FOR REFILLS, Disp: , Rfl:     Deep Sea Nasal Spray 0.65 % nasal spray, Administer 1 spray into the nostril(s) as directed by provider As Needed for Congestion., Disp: , Rfl:     ezetimibe (ZETIA) 10 MG tablet, Take 1 tablet by mouth Daily., Disp: , Rfl:     Farxiga 10 MG tablet, TAKE ONE (1) TABLET BY MOUTH EACH MORNING, Disp: , Rfl:     Insulin Glargine (BASAGLAR KWIKPEN) 100 UNIT/ML injection pen, Inject 50 Units under the skin into the appropriate area as directed Daily., Disp: , Rfl:     insulin glargine (LANTUS, SEMGLEE) 100 UNIT/ML injection, Inject 40 Units under the skin into the appropriate area as directed Daily., Disp: , Rfl:     Lancets (OneTouch Delica Plus [id number]) misc, , Disp: , Rfl:     Loratadine 10 MG capsule, Take  by mouth., Disp: , Rfl:     losartan (COZAAR) 100 MG tablet, Take 1 tablet by mouth Daily., Disp: , Rfl:     meloxicam (MOBIC) 7.5 MG tablet, Take 1 tablet by mouth Daily., Disp: 14 tablet, Rfl: 0    OneTouch Verio test strip, , Disp: , Rfl:     Semaglutide, 1 MG/DOSE, (Ozempic, 1 MG/DOSE,) 2 MG/1.5ML solution pen-injector, Inject  under the skin into the appropriate area as directed 1 (One) Time Per Week., Disp: , Rfl:     sildenafil (VIAGRA) 25 MG tablet, Take 1 tablet by mouth As Needed for Erectile Dysfunction., Disp: 20 tablet, Rfl: 3    cyclobenzaprine (FLEXERIL) 10 MG tablet, Take 1 tablet by mouth Every 8 (Eight) Hours As Needed for Muscle Spasms., Disp: 30 tablet, Rfl: 0    diclofenac (VOLTAREN) 50 MG EC tablet, Take 1 tablet by mouth 2 (Two) Times a Day for 3 days., Disp: 6 tablet, Rfl: 0    methocarbamol (ROBAXIN) 750 MG tablet, Take 1 tablet by mouth 3 (Three) Times a Day As Needed for Muscle Spasms. (Patient not taking: Reported on 4/9/2025), Disp: 21 tablet, Rfl: 0    Mounjaro 2.5 MG/0.5ML solution auto-injector, INJECT 2.5MG UNDER THE SKIN EVERY WEEK FOR FOUR WEEKS (Patient not taking: Reported on 5/7/2025), Disp: , Rfl:     polyethylene glycol (MIRALAX) 17  g packet, Take 17 g by mouth Daily., Disp: 30 packet, Rfl: 11    Testosterone Enanthate (Xyosted) 50 MG/0.5ML solution auto-injector, Inject 1 each under the skin into the appropriate area as directed 1 (One) Time Per Week., Disp: 2 mL, Rfl: 0    Facility-Administered Medications Ordered in Other Visits:     sodium chloride 0.9 % flush 10 mL, 10 mL, Intravenous, PRN, Juan Boogie, DO    Allergies   Allergen Reactions    Atorvastatin Nausea Only    Grapeseed Extract [Nutritional Supplements] Hives     Allergy to grapes    Metformin Other (See Comments)     Multiple issues specified by patient     Milk-Related Compounds Other (See Comments)     Runny nose, post nasal drip    Other Other (See Comments)     Powder detergents cause blisters      Red Dye #40 (Allura Red) Hives    Strawberry Hives     Objective   Visit Vitals  Pulse 89   Temp 97.1 °F (36.2 °C) (Temporal)   Resp 17   Wt 89.4 kg (197 lb)   SpO2 96%   BMI 32.66 kg/m²      Body mass index is 32.66 kg/m².   Physical Exam  Vitals and nursing note reviewed.   Constitutional:       General: He is in acute distress.      Appearance: Normal appearance. He is not ill-appearing.   HENT:      Head: Normocephalic and atraumatic.   Pulmonary:      Effort: Pulmonary effort is normal.   Musculoskeletal:      Lumbar back: Spasms and tenderness present.        Back:    Skin:     General: Skin is warm and dry.   Neurological:      General: No focal deficit present.      Mental Status: He is alert and oriented to person, place, and time.   Psychiatric:         Mood and Affect: Mood normal.         Behavior: Behavior normal.          Labs  Lab Results   Component Value Date    COLORU Yellow 05/07/2025    CLARITYU Clear 05/07/2025    SPECGRAV 1.010 05/07/2025    PHUR <=5.0 05/07/2025    LEUKOCYTESUR Negative 05/07/2025    NITRITE Negative 05/07/2025    PROTEINPOCUA Negative 05/07/2025    GLUCOSEUR >=1000 mg/dL (3+) (A) 05/07/2025    KETONESU Trace (A) 05/07/2025     "UROBILINOGEN 0.2 E.U./dL 05/07/2025    BILIRUBINUR Negative 05/07/2025    RBCUR Negative 05/07/2025      Lab Results   Component Value Date    WBC 13.83 (H) 05/07/2025    HGB 18.6 (H) 05/07/2025    HCT 55.7 (H) 05/07/2025    MCV 87.0 05/07/2025     05/07/2025     Lab Results   Component Value Date    GLUCOSE 193 (H) 05/07/2025    CALCIUM 9.4 05/07/2025     05/07/2025    K 5.4 (H) 05/07/2025    CO2 24.5 05/07/2025     05/07/2025    BUN 17 05/07/2025    BUN 19 12/11/2024    CREATININE 0.95 05/07/2025    CREATININE 0.83 12/11/2024    EGFR 91.6 05/07/2025    EGFR 100.8 12/11/2024    BCR 17.9 05/07/2025    ANIONGAP 12.5 05/07/2025    ALT 16 05/07/2025    AST 30 05/07/2025     Lab Results   Component Value Date    HGBA1C 8.50 (H) 12/11/2024     No results found for: \"PSA\", \"PCTFREEPSA\", \"PROSTATEBIO\"  No results found for: \"URICACIDSTN\", \"KZSH7XQTRNI\", \"ILMS1SYBYK\", \"LABMAGN\"  Lab Results   Component Value Date    ZLVF34FT 27.6 (L) 12/11/2024     Lab Results   Component Value Date    TESTOSTEROTT 688 12/11/2024    TESTOSTEROTT 277 09/16/2024    TESTOSTEROTT 127 (L) 08/14/2024    TESTFRE 13.4 12/11/2024    TESTFRE 5.6 (L) 09/16/2024    TESTFRE 4.8 (L) 08/14/2024    LH 6.25 08/14/2024     Radiographic Studies  No Images in the past 120 days found.    I have reviewed the above labs and imaging.     Assessment / Plan    Diagnoses and all orders for this visit:    1. Acute right-sided low back pain without sciatica (Primary)  -     POC Urinalysis Dipstick, Automated  -     diclofenac (VOLTAREN) 50 MG EC tablet; Take 1 tablet by mouth 2 (Two) Times a Day for 3 days.  Dispense: 6 tablet; Refill: 0  -     cyclobenzaprine (FLEXERIL) 10 MG tablet; Take 1 tablet by mouth Every 8 (Eight) Hours As Needed for Muscle Spasms.  Dispense: 30 tablet; Refill: 0    2. Hypogonadism in male  -     Testosterone Enanthate (Xyosted) 50 MG/0.5ML solution auto-injector; Inject 1 each under the skin into the appropriate area as " directed 1 (One) Time Per Week.  Dispense: 2 mL; Refill: 0    3. Polycythemia    4. Constipation, unspecified constipation type  -     polyethylene glycol (MIRALAX) 17 g packet; Take 17 g by mouth Daily.  Dispense: 30 packet; Refill: 11       Assessment & Plan  1. Back pain  - UA negative for leukocytes, nitrites and blood.    - Likely muscle strain  - Apply ice and heating pad for relief, avoid sleeping on heating pad  - Prescribed diclofenac 50 mg twice daily and cyclobenzaprine 10 mg for spasms, halve dose if drowsy  - Avoid driving or operating heavy machinery  - Discontinue methocarbamol   - Seek ER if pain is unmanageable    2. Elevated hematocrit and TRT   - Reduce testosterone dosage to 50 mg  - Schedule appointment for repeat blood draw in 6 weeks for HCT/HGB  - Prescribed Xyosted 50 mg  - Donate blood ASAP  - Consider discontinuing testosterone if hematocrit remains elevated    3. Constipation  - Prescribed MiraLAX  - Advised to increase water intake  - Stool softener not prescribed due to RED DYE allergy     When Mr. Irvin attempted to ambulate/stand he became very unsteady on his feet requiring assistance.  Initially declined wheelchair and then became very unsteady on his feet requiring assistance from myself and Terra Chen.  Because his pain is severe and he is having difficulty ambulating I instructed him to please proceed to the ER for evaluation.  We discussed that based on his symptoms and pain location and negative UA this is not likely related to stones.  No history of nephrolithiasis.  Patient and his significant other were excorted to the ED.        Return in about 6 weeks (around 6/18/2025) for f/u with Vicki for lab review .    Vicki Roy, MSN, APRN, FNP-C  [facility]

## 2025-05-07 NOTE — DISCHARGE INSTRUCTIONS
If you notice any concerning symptoms, please return to the ER immediately. These can include but are not limited to: worsening of you condition, fevers, chills, shortness of breath, vomiting, weakness of the extremities, changes in your mental status, numbness, pale extremities, or chest pain.     Take medications as prescribed, your pharmacist may have additional recommendations concerning these medications.    For pain use ibuprofen (Motrin) or acetaminophen (Tylenol), unless prescribed medications that also contain these medications.  You can take over the counter acetaminophen or ibuprofen, please follow the directions as dosages differ. Do not take ibuprofen if you have a history of peptic ulcers, kidney disease, bariatric surgery, or are currently pregnant.  Do not take Tylenol if you have a history of liver disease or alcohol use disorder.        THANK YOU!!! From [facility] Emergency Department    On behalf of the Emergency Department staff at [facility], I would like to thank you for giving us the opportunity to address your health care needs and concerns. We hope that during your visit, our service was delivered in a professional and caring manner. Please keep [facility] in mind as we walk with you down the path to your own personal wellness. Please expect follow-up phone calls concerning additional care and questions about your experience.      You have received additional information specific to your diagnosis in these discharge instructions, please read these fully.  Anytime you have been seen in the emergency department we recommend close follow up with your primary care provider or specialist, please follow these directions as indicated.

## 2025-05-13 ENCOUNTER — LAB (OUTPATIENT)
Dept: LAB | Facility: HOSPITAL | Age: 60
End: 2025-05-13
Payer: COMMERCIAL

## 2025-05-13 DIAGNOSIS — E29.1 HYPOGONADISM IN MALE: ICD-10-CM

## 2025-05-13 DIAGNOSIS — D75.1 POLYCYTHEMIA: ICD-10-CM

## 2025-05-13 LAB
HCT VFR BLD AUTO: 55.3 % (ref 37.5–51)
HCT VFR BLD AUTO: 55.3 % (ref 37.5–51)
HGB BLD-MCNC: 18.5 G/DL (ref 13–17.7)
HGB BLD-MCNC: 18.5 G/DL (ref 13–17.7)
PSA SERPL-MCNC: 2.35 NG/ML (ref 0–4)

## 2025-05-13 PROCEDURE — 84153 ASSAY OF PSA TOTAL: CPT

## 2025-05-13 PROCEDURE — 84402 ASSAY OF FREE TESTOSTERONE: CPT

## 2025-05-13 PROCEDURE — 36415 COLL VENOUS BLD VENIPUNCTURE: CPT

## 2025-05-13 PROCEDURE — 84403 ASSAY OF TOTAL TESTOSTERONE: CPT

## 2025-05-13 PROCEDURE — 85018 HEMOGLOBIN: CPT

## 2025-05-13 PROCEDURE — 85014 HEMATOCRIT: CPT

## 2025-05-14 NOTE — TELEPHONE ENCOUNTER
I called and spoke to Tina Elizabeth and let her know that Neftali has to go donate blood or get PHLEB appt this week or the beginning of next week.

## 2025-05-14 NOTE — TELEPHONE ENCOUNTER
----- Message from Vicki Roy sent at 5/14/2025  1:07 PM EDT -----  Regarding: polycythemia  Andreia  Will you call Neftali and see if he has donated blood.  He needs to have this done now.  If he is not able to get this done this week or early next week I will discontinue his testosterone.    Vicki   ----- Message -----  From: Lab, Background User  Sent: 5/13/2025   6:54 PM EDT  To: JACKIE Ledesma

## 2025-05-15 LAB
TESTOST FREE SERPL-MCNC: 9.4 PG/ML (ref 6.6–18.1)
TESTOST SERPL-MCNC: 480 NG/DL (ref 264–916)

## 2025-05-15 NOTE — TELEPHONE ENCOUNTER
I faxed [facility] order to [phone] with demo and insurance card and called [facility] and let them know it's been faxed.

## 2025-05-15 NOTE — TELEPHONE ENCOUNTER
SARA FARNSWORTH CALLED AND SAID THE [facility] WOULD NOT SEE THE PATIENT . THE [facility] TOLD THE PATIENT THE SOONEST HE COULD HAVE IT DONE THERE WOULD BE FRIDAY MAY 23RD. THEY WERE GIVEN PAPERWORK BY THE [place] [facility] [place] THAT WOULD ALLOW EARLIER TREATMENT BUT SAHIL WOULD NEED TO FILL THIS OUT FOR THEM. PLEASE REACH OUT TO ADVISE.    BEST NUMBER [phone]

## 2025-05-21 DIAGNOSIS — E29.1 HYPOGONADISM IN MALE: ICD-10-CM

## 2025-05-21 RX ORDER — TESTOSTERONE ENANTHATE 50 MG/.5ML
INJECTION SUBCUTANEOUS
Qty: 2 ML | Refills: 0 | OUTPATIENT
Start: 2025-05-21

## 2025-06-06 DIAGNOSIS — E29.1 HYPOGONADISM IN MALE: ICD-10-CM

## 2025-06-06 RX ORDER — TESTOSTERONE ENANTHATE 50 MG/.5ML
INJECTION SUBCUTANEOUS
Qty: 2 ML | Refills: 0 | Status: SHIPPED | OUTPATIENT
Start: 2025-06-06

## 2025-06-06 NOTE — TELEPHONE ENCOUNTER
Refill  Pt is compliant with testosterone policy     [facility] in [place],[facility]

## 2025-06-18 ENCOUNTER — LAB (OUTPATIENT)
Dept: LAB | Facility: HOSPITAL | Age: 60
End: 2025-06-18
Payer: COMMERCIAL

## 2025-06-18 ENCOUNTER — OFFICE VISIT (OUTPATIENT)
Dept: UROLOGY | Facility: CLINIC | Age: 60
End: 2025-06-18
Payer: COMMERCIAL

## 2025-06-18 VITALS
BODY MASS INDEX: 32.65 KG/M2 | HEART RATE: 84 BPM | DIASTOLIC BLOOD PRESSURE: 74 MMHG | WEIGHT: 196 LBS | HEIGHT: 65 IN | OXYGEN SATURATION: 96 % | TEMPERATURE: 97.3 F | SYSTOLIC BLOOD PRESSURE: 122 MMHG

## 2025-06-18 DIAGNOSIS — D75.1 POLYCYTHEMIA: ICD-10-CM

## 2025-06-18 DIAGNOSIS — E29.1 HYPOGONADISM IN MALE: Primary | ICD-10-CM

## 2025-06-18 LAB
HCT VFR BLD AUTO: 52.9 % (ref 37.5–51)
HGB BLD-MCNC: 17.6 G/DL (ref 13–17.7)

## 2025-06-18 PROCEDURE — 36415 COLL VENOUS BLD VENIPUNCTURE: CPT

## 2025-06-18 PROCEDURE — 85018 HEMOGLOBIN: CPT

## 2025-06-18 PROCEDURE — 85014 HEMATOCRIT: CPT

## 2025-06-18 RX ORDER — OLOPATADINE HYDROCHLORIDE 1 MG/ML
SOLUTION OPHTHALMIC
COMMUNITY
Start: 2025-04-25

## 2025-06-18 NOTE — PROGRESS NOTES
"       Office Visit     Patient Name: Neftali Irvin  : 1965   MRN: [id number]   Patient or patient representative verbalized consent for the use of Ambient Listening during the visit with  JACKIE Ledesma for chart documentation. 2025  14:43 EDT    Chief Complaint   Patient presents with    Follow-up     6 month check with labs PSA/TRT     Referring Provider: No ref. provider found    Primary Care Provider: Mary Machado APRN     History of Present Illness  Mr. Neftali Irvin is a 60 year old male with hypogonadism and persistent polycythemia.  He presents to discuss his labs today.  At his last visit he was in considerable back pain and was found to have a muscle strain.      Hypogonadism  and polycythemia   - He reports overall health improvement since the last consultation.  - He walked 5-6 miles yesterday, resulting in foot soreness.  - He mentions a recent blood donation.  - Prior to testosterone therapy, he was informed of excessively thick blood     MYRNA  - Compliant every night with CPAP therapy    Subjective   Review of System: As noted in HPI.    Past Medical History:   Diagnosis Date    Acid reflux     ADHD     Allergic rhinitis 2015    Arthritis     Asthma     Asthma, extrinsic     Had as a child    Blood vessel problems in eyes     states eye doctor told him he had \"some bleeding\" behind his eyes due to DM    Cataract, bilateral     no surgery yet    Colon polyp     Colon polyps     Depression     Diabetes mellitus     Elevated cholesterol     Erectile dysfunction     Gout     High cholesterol     Histoplasmosis     eye (doesn't remember laterality)    Hx of exercise stress test     Hypertension     Pneumonia 2022    Usually get it during winter and early spring    PONV (postoperative nausea and vomiting)     Primary central sleep apnea 2015    Approximately Dr. Watkins at [place]    Sleep apnea     non-compliant with CPAP    Teeth missing     Wears glasses      Past " Surgical History:   Procedure Laterality Date    COLONOSCOPY N/A 2021    Procedure: COLONOSCOPY WITH HOT SNARE POLYPECTOMY AND TATTOO INJECTION, CLIP PLACEMENT X1;  Surgeon: Michael Marroquin MD;  Location: [facility] ENDOSCOPY;  Service: Gastroenterology;  Laterality: N/A;    FOOT SURGERY Right     pellet in foot    HERNIA REPAIR Left 2021    groin     Family History   Problem Relation Age of Onset    Heart disease Father     Cancer Mother 44        She had it in her cervix and passed away from it    Diabetes Mother     Miscarriages / Stillbirths Mother     Cancer Maternal Aunt     Diabetes Maternal Grandmother     Asthma Sister     Diabetes Sister     Hypertension Sister         She has had several bypasses    Kidney disease Sister     Miscarriages / Stillbirths Sister     Cancer Brother         He  from it    Cancer Sister     Hypertension Sister     Cancer Sister         She had endometrial cancer    Diabetes Brother     Hypertension Brother     Diabetes Sister     Hypertension Sister     Prostate cancer Neg Hx      Social History     Socioeconomic History    Marital status:    Tobacco Use    Smoking status: Never     Passive exposure: Never    Smokeless tobacco: Never   Vaping Use    Vaping status: Never Used   Substance and Sexual Activity    Alcohol use: Never    Drug use: Never    Sexual activity: Yes     Partners: Female       Current Outpatient Medications:     albuterol sulfate  (90 Base) MCG/ACT inhaler, Inhale 2 puffs Every 4 (Four) Hours As Needed for Wheezing., Disp: 18 g, Rfl: 3    Aspirin Low Dose 81 MG EC tablet, , Disp: , Rfl:     B-D UF III MINI PEN NEEDLES 31G X 5 MM misc, , Disp: , Rfl:     chlorthalidone (HYGROTON) 25 MG tablet, , Disp: , Rfl:     cholecalciferol 25 MCG tablet, Take 1 tablet by mouth Daily., Disp: , Rfl:     Continuous Glucose Sensor (Dexcom G6 Sensor), USE FOR CONTINUOUS GLUCOSE MONITORING CHANGING SENSOR EVERY 10 DAYS, PATIENT NEEDS APPT, Disp: , Rfl:      Continuous Glucose Transmitter (Dexcom G6 Transmitter) misc, USE FOR CONTINUOUS GLUCOSE MONITORING, CHANGING TRANSMITTER EVERY 90 DAYS, PATIENT NEEDS APPT FOR REFILLS, Disp: , Rfl:     cyclobenzaprine (FLEXERIL) 10 MG tablet, Take 1 tablet by mouth Every 8 (Eight) Hours As Needed for Muscle Spasms., Disp: 30 tablet, Rfl: 0    Deep Sea Nasal Spray 0.65 % nasal spray, Administer 1 spray into the nostril(s) as directed by provider As Needed for Congestion., Disp: , Rfl:     ezetimibe (ZETIA) 10 MG tablet, Take 1 tablet by mouth Daily., Disp: , Rfl:     Farxiga 10 MG tablet, TAKE ONE (1) TABLET BY MOUTH EACH MORNING, Disp: , Rfl:     Insulin Glargine (BASAGLAR KWIKPEN) 100 UNIT/ML injection pen, Inject 50 Units under the skin into the appropriate area as directed Daily., Disp: , Rfl:     insulin glargine (LANTUS, SEMGLEE) 100 UNIT/ML injection, Inject 40 Units under the skin into the appropriate area as directed Daily., Disp: , Rfl:     Lancets (OneTouch Delica Plus Apdldx49P) misc, , Disp: , Rfl:     Loratadine 10 MG capsule, Take  by mouth., Disp: , Rfl:     losartan (COZAAR) 100 MG tablet, Take 1 tablet by mouth Daily., Disp: , Rfl:     meloxicam (MOBIC) 7.5 MG tablet, Take 1 tablet by mouth Daily., Disp: 14 tablet, Rfl: 0    olopatadine (PATANOL) 0.1 % ophthalmic solution, instill one (1) drop in both eyes twice a day, Disp: , Rfl:     OneTouch Verio test strip, , Disp: , Rfl:     polyethylene glycol (MIRALAX) 17 g packet, Take 17 g by mouth Daily., Disp: 30 packet, Rfl: 11    sildenafil (VIAGRA) 25 MG tablet, Take 1 tablet by mouth As Needed for Erectile Dysfunction., Disp: 20 tablet, Rfl: 3    Allergies   Allergen Reactions    Atorvastatin Nausea Only    Grapeseed Extract [Nutritional Supplements] Hives     Allergy to grapes    Metformin Other (See Comments)     Multiple issues specified by patient     Milk-Related Compounds Other (See Comments)     Runny nose, post nasal drip    Other Other (See Comments)      "Powder detergents cause blisters      Red Dye #40 (Allura Red) Hives    Strawberry Hives     Objective   Visit Vitals  /74   Pulse 84   Temp 97.3 °F (36.3 °C)   Ht 165 cm (64.96\")   Wt 88.9 kg (196 lb)   SpO2 96%   BMI 32.66 kg/m²      Body mass index is 32.66 kg/m².   Physical Exam  Vitals and nursing note reviewed.   Constitutional:       General: He is not in acute distress.     Appearance: Normal appearance. He is not ill-appearing.   HENT:      Head: Normocephalic and atraumatic.   Pulmonary:      Effort: Pulmonary effort is normal.   Skin:     General: Skin is warm and dry.   Neurological:      General: No focal deficit present.      Mental Status: He is alert and oriented to person, place, and time.   Psychiatric:         Mood and Affect: Mood normal.         Behavior: Behavior normal.      Labs  Lab Results   Component Value Date    COLORU Yellow 05/07/2025    CLARITYU Clear 05/07/2025    SPECGRAV 1.010 05/07/2025    PHUR <=5.0 05/07/2025    LEUKOCYTESUR Negative 05/07/2025    NITRITE Negative 05/07/2025    PROTEINPOCUA Negative 05/07/2025    GLUCOSEUR >=1000 mg/dL (3+) (A) 05/07/2025    KETONESU Trace (A) 05/07/2025    UROBILINOGEN 0.2 E.U./dL 05/07/2025    BILIRUBINUR Negative 05/07/2025    RBCUR Negative 05/07/2025      No results found for: \"WBCUA\", \"RBCUA\", \"BACTERIA\", \"HYALCASTU\", \"SQUAMEPIUA\"     Lab Results   Component Value Date    WBC 13.83 (H) 05/07/2025    HGB 18.5 (H) 05/13/2025    HGB 18.5 (H) 05/13/2025    HCT 55.3 (H) 05/13/2025    HCT 55.3 (H) 05/13/2025    MCV 87.0 05/07/2025     05/07/2025     Lab Results   Component Value Date    GLUCOSE 193 (H) 05/07/2025    CALCIUM 9.4 05/07/2025     05/07/2025    K 4.3 05/07/2025    CO2 24.5 05/07/2025     05/07/2025    BUN 17 05/07/2025    BUN 19 12/11/2024    CREATININE 0.95 05/07/2025    CREATININE 0.83 12/11/2024    EGFR 91.6 05/07/2025    EGFR 100.8 12/11/2024    BCR 17.9 05/07/2025    ANIONGAP 12.5 05/07/2025    ALT 16 " "05/07/2025    AST 30 05/07/2025     Lab Results   Component Value Date    HGBA1C 8.50 (H) 12/11/2024     Lab Results   Component Value Date    PSA 2.350 05/13/2025     No results found for: \"URICACIDSTN\", \"CJOA1SZEEBD\", \"LIDW6KPVAA\", \"LABMAGN\"  Lab Results   Component Value Date    BSIT58OZ 27.6 (L) 12/11/2024     No results found for: \"CYSTINE\", \"URINEVOLUM\", \"CALCIUMUR\", \"OXALATEU\", \"CITRATEUR\", \"LABPH\", \"URICUR\", \"NAUR\", \"KUR\", \"MAGNESIUMUR\", \"PHOSUR\", \"AMMONIUMUR\", \"CLUR\", \"HJZXGBKRA60F\", \"UREAUR\", \"LABCREAUR\"  Lab Results   Component Value Date    TESTOSTEROTT 480 05/13/2025    TESTOSTEROTT 688 12/11/2024    TESTOSTEROTT 277 09/16/2024    TESTFRE 9.4 05/13/2025    TESTFRE 13.4 12/11/2024    TESTFRE 5.6 (L) 09/16/2024    PSA 2.350 05/13/2025    LH 6.25 08/14/2024       Radiographic Studies  CT Abdomen Pelvis With Contrast  Result Date: 5/7/2025  No evidence of acute intra-abdominal process.  No nephrolithiasis or hydronephrosis identified.   CTDI: 9.07 mGy DLP: 470.01 mGy.cm   This study was performed with techniques to keep radiation doses as low as reasonably achievable (ALARA). Individualized dose reduction techniques using automated exposure control or adjustment of mA and/or kV according to the patient size were employed.      Images were reviewed, interpreted, and dictated by Dr. Teresa Panchal MD Transcribed by Olivia Padgett PA-C.  This report was signed and finalized on 5/7/2025 3:21 PM by Teresa Panchal MD.      I have reviewed the above labs and imaging.       Assessment / Plan    Diagnoses and all orders for this visit:    1. Hypogonadism in male (Primary)    2. Polycythemia       Assessment & Plan  1. Polycythemia  - Hemoglobin and hematocrit levels remain elevated, necessitating further investigation  - Temporarily discontinue testosterone therapy until etiology is determined  - Re-evaluation of hemoglobin and hematocrit levels today. Last blood donation less than 1 month ago.    - Advise patient to " consult with primary care provider for comprehensive workup  - Inform patient of treatment plan upon receipt of lab results  - Consider resumption of testosterone therapy if polycythemia is not due to an alternative condition and we can get better control.    - Discuss potential risks of stroke and heart attack associated with elevated hemoglobin and hematocrit levels      2. Hypogonadism  - Last testosterone level was within normal limits  - concerned for persistent polycythemia over the past 6 months.    - Temporarily holding testosterone.       Return in about 4 months (around 10/18/2025) for f/u with Vicki to review labs.  .    Vicki Roy, MSN, APRN, FNP-C  [facility]

## 2025-06-30 ENCOUNTER — TELEPHONE (OUTPATIENT)
Dept: UROLOGY | Facility: CLINIC | Age: 60
End: 2025-06-30

## 2025-06-30 ENCOUNTER — HOSPITAL ENCOUNTER (OUTPATIENT)
Facility: HOSPITAL | Age: 60
Discharge: HOME | End: 2025-06-30
Admitting: NURSE PRACTITIONER
Payer: COMMERCIAL

## 2025-06-30 DIAGNOSIS — D75.1 POLYCYTHEMIA: Primary | ICD-10-CM

## 2025-06-30 LAB
HCT VFR BLD AUTO: 49.6 % (ref 37.5–51)
HGB BLD-MCNC: 16.5 G/DL (ref 13–17.7)

## 2025-06-30 PROCEDURE — 85014 HEMATOCRIT: CPT | Performed by: NURSE PRACTITIONER

## 2025-06-30 PROCEDURE — 99195 PHLEBOTOMY: CPT

## 2025-06-30 PROCEDURE — 85018 HEMOGLOBIN: CPT | Performed by: NURSE PRACTITIONER

## 2025-06-30 NOTE — TELEPHONE ENCOUNTER
Hub staff attempted to follow warm transfer process and was unsuccessful     Caller: Tina Sotelo    Relationship to patient: Emergency Contact    Best call back number:   [phone]    Patient is needing: PT WANTS TO KNOW IF HE STILL NEEDS HIS PHLEBOTOMY TODAY. PLEASE CALL TO ADVISE. THANK YOU

## 2025-07-31 ENCOUNTER — TELEPHONE (OUTPATIENT)
Dept: SURGERY | Facility: CLINIC | Age: 60
End: 2025-07-31
Payer: COMMERCIAL

## 2025-07-31 NOTE — TELEPHONE ENCOUNTER
Tried to reach the pt regarding a colonoscopy referral we received from JACKIE Fernandez-VM box is full-Will try again to reach the pt to go over the colonoscopy screening questions. 1st attempt.     *IF PT RETURNS THIS CALL, PLEASE SEND THE CALL TO THE OFFICE. ASK FOR MIKIE

## 2025-08-04 NOTE — TELEPHONE ENCOUNTER
Left VM for the pt regarding this referral-Will try again to reach the pt to go over the colonoscopy screening questions. 2nd attempt.      *IF PT RETURNS THIS CALL, PLEASE SEND THE CALL TO THE OFFICE. ASK FOR MIKIE

## 2025-08-06 ENCOUNTER — OFFICE VISIT (OUTPATIENT)
Dept: UROLOGY | Facility: CLINIC | Age: 60
End: 2025-08-06
Payer: COMMERCIAL

## 2025-08-06 VITALS
BODY MASS INDEX: 33.69 KG/M2 | OXYGEN SATURATION: 95 % | TEMPERATURE: 97.2 F | HEIGHT: 65 IN | RESPIRATION RATE: 16 BRPM | HEART RATE: 85 BPM | WEIGHT: 202.2 LBS

## 2025-08-06 DIAGNOSIS — E29.1 HYPOGONADISM IN MALE: Primary | ICD-10-CM

## 2025-08-06 DIAGNOSIS — D75.1 POLYCYTHEMIA: ICD-10-CM

## 2025-08-06 RX ORDER — DICLOFENAC SODIUM 75 MG/1
75 TABLET, DELAYED RELEASE ORAL 3 TIMES DAILY
COMMUNITY
Start: 2025-07-29

## 2025-08-06 RX ORDER — TIRZEPATIDE 10 MG/.5ML
10 INJECTION, SOLUTION SUBCUTANEOUS WEEKLY
COMMUNITY
Start: 2025-07-21

## 2025-08-06 RX ORDER — METHOCARBAMOL 750 MG/1
750 TABLET, FILM COATED ORAL 3 TIMES DAILY
COMMUNITY
Start: 2025-07-29

## 2025-08-26 ENCOUNTER — OFFICE VISIT (OUTPATIENT)
Dept: SURGERY | Facility: CLINIC | Age: 60
End: 2025-08-26
Payer: COMMERCIAL

## 2025-08-26 VITALS
WEIGHT: 203 LBS | TEMPERATURE: 98 F | DIASTOLIC BLOOD PRESSURE: 72 MMHG | SYSTOLIC BLOOD PRESSURE: 148 MMHG | HEART RATE: 88 BPM | OXYGEN SATURATION: 95 % | HEIGHT: 65 IN | BODY MASS INDEX: 33.82 KG/M2

## 2025-08-26 DIAGNOSIS — D12.6 ADENOMATOUS POLYP OF COLON, UNSPECIFIED PART OF COLON: Primary | ICD-10-CM

## 2025-08-26 PROCEDURE — 1159F MED LIST DOCD IN RCRD: CPT | Performed by: SURGERY

## 2025-08-26 PROCEDURE — 3077F SYST BP >= 140 MM HG: CPT | Performed by: SURGERY

## 2025-08-26 PROCEDURE — S0260 H&P FOR SURGERY: HCPCS | Performed by: SURGERY

## 2025-08-26 PROCEDURE — 3078F DIAST BP <80 MM HG: CPT | Performed by: SURGERY

## 2025-08-26 PROCEDURE — 1160F RVW MEDS BY RX/DR IN RCRD: CPT | Performed by: SURGERY

## 2025-08-27 ENCOUNTER — PREP FOR SURGERY (OUTPATIENT)
Dept: OTHER | Facility: HOSPITAL | Age: 60
End: 2025-08-27
Payer: COMMERCIAL

## 2025-08-27 DIAGNOSIS — D12.6 ADENOMATOUS POLYP OF COLON, UNSPECIFIED PART OF COLON: Primary | ICD-10-CM

## 2025-08-27 RX ORDER — BISACODYL 5 MG
TABLET, DELAYED RELEASE (ENTERIC COATED) ORAL
Qty: 4 TABLET | Refills: 0 | Status: SHIPPED | OUTPATIENT
Start: 2025-08-27

## 2025-08-27 RX ORDER — POLYETHYLENE GLYCOL 3350 17 G/17G
17 POWDER, FOR SOLUTION ORAL DAILY
Qty: 238 G | Refills: 0 | Status: SHIPPED | OUTPATIENT
Start: 2025-08-27

## (undated) DEVICE — LUBE JELLY PK/2.75GM STRL BX/144

## (undated) DEVICE — GLV SURG SENSICARE W/ALOE PF LF 8.5 STRL

## (undated) DEVICE — HYBRID TUBING/CAP SET FOR OLYMPUS® SCOPES: Brand: ERBE

## (undated) DEVICE — PAD GRND REM POLYHESIVE A/ DISP

## (undated) DEVICE — VLV SXN AIR/H2O ORCAPOD3 1P/U STRL

## (undated) DEVICE — SNAR POLYP SENSATION STDOVL 27 240 BX40

## (undated) DEVICE — QUICK CATCH IN-LINE SUCTION POLYP TRAP IS USED FOR SUCTION RETRIEVAL OF ENDOSCOPICALLY REMOVED POLYPS.

## (undated) DEVICE — Device

## (undated) DEVICE — NDL SCLEROTHERAPY INTERJECT 25G 4 240CM

## (undated) DEVICE — FRCP BIOP RADLJAW4 HOT 2.2X240 BX40

## (undated) DEVICE — ENDOSCOPY PORT CONNECTOR FOR OLYMPUS® SCOPES: Brand: ERBE

## (undated) DEVICE — Device: Brand: SPOT EX ENDOSCOPIC TATTOO